# Patient Record
Sex: FEMALE | Race: WHITE | Employment: UNEMPLOYED | URBAN - METROPOLITAN AREA
[De-identification: names, ages, dates, MRNs, and addresses within clinical notes are randomized per-mention and may not be internally consistent; named-entity substitution may affect disease eponyms.]

---

## 2022-10-10 ENCOUNTER — HOSPITAL ENCOUNTER (INPATIENT)
Age: 67
LOS: 6 days | Discharge: HOME HEALTH | DRG: 641 | End: 2022-10-16
Attending: STUDENT IN AN ORGANIZED HEALTH CARE EDUCATION/TRAINING PROGRAM | Admitting: GENERAL PRACTICE
Payer: MEDICARE

## 2022-10-10 ENCOUNTER — APPOINTMENT (OUTPATIENT)
Dept: ULTRASOUND IMAGING | Age: 67
DRG: 641 | End: 2022-10-10
Payer: MEDICARE

## 2022-10-10 ENCOUNTER — APPOINTMENT (OUTPATIENT)
Dept: GENERAL RADIOLOGY | Age: 67
DRG: 641 | End: 2022-10-10
Payer: MEDICARE

## 2022-10-10 ENCOUNTER — APPOINTMENT (OUTPATIENT)
Dept: CT IMAGING | Age: 67
DRG: 641 | End: 2022-10-10
Payer: MEDICARE

## 2022-10-10 DIAGNOSIS — N39.0 URINARY TRACT INFECTION WITH HEMATURIA, SITE UNSPECIFIED: ICD-10-CM

## 2022-10-10 DIAGNOSIS — R60.0 LEG EDEMA: ICD-10-CM

## 2022-10-10 DIAGNOSIS — E87.1 HYPONATREMIA: Primary | ICD-10-CM

## 2022-10-10 DIAGNOSIS — R18.8 CIRRHOSIS OF LIVER WITH ASCITES, UNSPECIFIED HEPATIC CIRRHOSIS TYPE (HCC): ICD-10-CM

## 2022-10-10 DIAGNOSIS — R31.9 URINARY TRACT INFECTION WITH HEMATURIA, SITE UNSPECIFIED: ICD-10-CM

## 2022-10-10 DIAGNOSIS — K74.60 CIRRHOSIS OF LIVER WITH ASCITES, UNSPECIFIED HEPATIC CIRRHOSIS TYPE (HCC): ICD-10-CM

## 2022-10-10 DIAGNOSIS — R17 ELEVATED BILIRUBIN: ICD-10-CM

## 2022-10-10 DIAGNOSIS — D64.9 ANEMIA, UNSPECIFIED TYPE: ICD-10-CM

## 2022-10-10 LAB
ALBUMIN SERPL-MCNC: 3.5 G/DL (ref 3.5–5.2)
ALP BLD-CCNC: 89 U/L (ref 35–104)
ALT SERPL-CCNC: 28 U/L (ref 0–32)
ANION GAP SERPL CALCULATED.3IONS-SCNC: 13 MMOL/L (ref 7–16)
ANISOCYTOSIS: ABNORMAL
AST SERPL-CCNC: 49 U/L (ref 0–31)
BACTERIA: ABNORMAL /HPF
BASOPHILS ABSOLUTE: 0.03 E9/L (ref 0–0.2)
BASOPHILS RELATIVE PERCENT: 0.5 % (ref 0–2)
BILIRUB SERPL-MCNC: 1.3 MG/DL (ref 0–1.2)
BILIRUBIN URINE: ABNORMAL
BLOOD, URINE: NEGATIVE
BUN BLDV-MCNC: 23 MG/DL (ref 6–23)
CALCIUM SERPL-MCNC: 9 MG/DL (ref 8.6–10.2)
CHLORIDE BLD-SCNC: 88 MMOL/L (ref 98–107)
CHLORIDE URINE RANDOM: 23 MMOL/L
CLARITY: CLEAR
CO2: 21 MMOL/L (ref 22–29)
COLOR: ABNORMAL
CREAT SERPL-MCNC: 1 MG/DL (ref 0.5–1)
EOSINOPHILS ABSOLUTE: 0 E9/L (ref 0.05–0.5)
EOSINOPHILS RELATIVE PERCENT: 0 % (ref 0–6)
GFR AFRICAN AMERICAN: >60
GFR NON-AFRICAN AMERICAN: 55 ML/MIN/1.73
GLUCOSE BLD-MCNC: 261 MG/DL (ref 74–99)
GLUCOSE URINE: 250 MG/DL
HCT VFR BLD CALC: 29.8 % (ref 34–48)
HEMOGLOBIN: 8.4 G/DL (ref 11.5–15.5)
HYPOCHROMIA: ABNORMAL
IMMATURE GRANULOCYTES #: 0.04 E9/L
IMMATURE GRANULOCYTES %: 0.6 % (ref 0–5)
KETONES, URINE: ABNORMAL MG/DL
LEUKOCYTE ESTERASE, URINE: NEGATIVE
LIPASE: 92 U/L (ref 13–60)
LYMPHOCYTES ABSOLUTE: 1.07 E9/L (ref 1.5–4)
LYMPHOCYTES RELATIVE PERCENT: 16.1 % (ref 20–42)
MCH RBC QN AUTO: 19.2 PG (ref 26–35)
MCHC RBC AUTO-ENTMCNC: 28.2 % (ref 32–34.5)
MCV RBC AUTO: 68 FL (ref 80–99.9)
MONOCYTES ABSOLUTE: 0.66 E9/L (ref 0.1–0.95)
MONOCYTES RELATIVE PERCENT: 9.9 % (ref 2–12)
NEUTROPHILS ABSOLUTE: 4.86 E9/L (ref 1.8–7.3)
NEUTROPHILS RELATIVE PERCENT: 72.9 % (ref 43–80)
NITRITE, URINE: POSITIVE
OSMOLALITY URINE: 591 MOSM/KG (ref 300–900)
OSMOLALITY: 290 MOSM/KG (ref 285–310)
OVALOCYTES: ABNORMAL
PDW BLD-RTO: 18.1 FL (ref 11.5–15)
PH UA: 5.5 (ref 5–9)
PLATELET # BLD: 89 E9/L (ref 130–450)
PLATELET CONFIRMATION: NORMAL
PMV BLD AUTO: ABNORMAL FL (ref 7–12)
POIKILOCYTES: ABNORMAL
POLYCHROMASIA: ABNORMAL
POTASSIUM REFLEX MAGNESIUM: 4.2 MMOL/L (ref 3.5–5)
POTASSIUM, UR: 97.8 MMOL/L
PRO-BNP: 518 PG/ML (ref 0–125)
PROTEIN UA: 30 MG/DL
RBC # BLD: 4.38 E12/L (ref 3.5–5.5)
RBC UA: ABNORMAL /HPF (ref 0–2)
SODIUM BLD-SCNC: 122 MMOL/L (ref 132–146)
SODIUM URINE: <20 MMOL/L
SPECIFIC GRAVITY UA: >=1.03 (ref 1–1.03)
TOTAL PROTEIN: 7.9 G/DL (ref 6.4–8.3)
TROPONIN, HIGH SENSITIVITY: 16 NG/L (ref 0–9)
UROBILINOGEN, URINE: 1 E.U./DL
WBC # BLD: 6.7 E9/L (ref 4.5–11.5)
WBC UA: ABNORMAL /HPF (ref 0–5)

## 2022-10-10 PROCEDURE — 81001 URINALYSIS AUTO W/SCOPE: CPT

## 2022-10-10 PROCEDURE — 85025 COMPLETE CBC W/AUTO DIFF WBC: CPT

## 2022-10-10 PROCEDURE — 87077 CULTURE AEROBIC IDENTIFY: CPT

## 2022-10-10 PROCEDURE — 74176 CT ABD & PELVIS W/O CONTRAST: CPT

## 2022-10-10 PROCEDURE — 87186 SC STD MICRODIL/AGAR DIL: CPT

## 2022-10-10 PROCEDURE — 93970 EXTREMITY STUDY: CPT

## 2022-10-10 PROCEDURE — 83935 ASSAY OF URINE OSMOLALITY: CPT

## 2022-10-10 PROCEDURE — 84133 ASSAY OF URINE POTASSIUM: CPT

## 2022-10-10 PROCEDURE — 80053 COMPREHEN METABOLIC PANEL: CPT

## 2022-10-10 PROCEDURE — 83690 ASSAY OF LIPASE: CPT

## 2022-10-10 PROCEDURE — 87088 URINE BACTERIA CULTURE: CPT

## 2022-10-10 PROCEDURE — 84484 ASSAY OF TROPONIN QUANT: CPT

## 2022-10-10 PROCEDURE — 2060000000 HC ICU INTERMEDIATE R&B

## 2022-10-10 PROCEDURE — 99285 EMERGENCY DEPT VISIT HI MDM: CPT

## 2022-10-10 PROCEDURE — 96374 THER/PROPH/DIAG INJ IV PUSH: CPT

## 2022-10-10 PROCEDURE — 84300 ASSAY OF URINE SODIUM: CPT

## 2022-10-10 PROCEDURE — 36415 COLL VENOUS BLD VENIPUNCTURE: CPT

## 2022-10-10 PROCEDURE — 2580000003 HC RX 258: Performed by: STUDENT IN AN ORGANIZED HEALTH CARE EDUCATION/TRAINING PROGRAM

## 2022-10-10 PROCEDURE — 6360000002 HC RX W HCPCS: Performed by: STUDENT IN AN ORGANIZED HEALTH CARE EDUCATION/TRAINING PROGRAM

## 2022-10-10 PROCEDURE — 83880 ASSAY OF NATRIURETIC PEPTIDE: CPT

## 2022-10-10 PROCEDURE — 93005 ELECTROCARDIOGRAM TRACING: CPT | Performed by: PHYSICIAN ASSISTANT

## 2022-10-10 PROCEDURE — 71046 X-RAY EXAM CHEST 2 VIEWS: CPT

## 2022-10-10 PROCEDURE — 82436 ASSAY OF URINE CHLORIDE: CPT

## 2022-10-10 PROCEDURE — 6370000000 HC RX 637 (ALT 250 FOR IP): Performed by: STUDENT IN AN ORGANIZED HEALTH CARE EDUCATION/TRAINING PROGRAM

## 2022-10-10 PROCEDURE — 83930 ASSAY OF BLOOD OSMOLALITY: CPT

## 2022-10-10 RX ORDER — FUROSEMIDE 40 MG/1
20 TABLET ORAL ONCE
Status: COMPLETED | OUTPATIENT
Start: 2022-10-10 | End: 2022-10-10

## 2022-10-10 RX ORDER — OXYCODONE HYDROCHLORIDE AND ACETAMINOPHEN 5; 325 MG/1; MG/1
1 TABLET ORAL ONCE
Status: COMPLETED | OUTPATIENT
Start: 2022-10-10 | End: 2022-10-10

## 2022-10-10 RX ORDER — SODIUM CHLORIDE 1000 MG
1 TABLET, SOLUBLE MISCELLANEOUS ONCE
Status: COMPLETED | OUTPATIENT
Start: 2022-10-10 | End: 2022-10-10

## 2022-10-10 RX ADMIN — Medication 1 G: at 21:51

## 2022-10-10 RX ADMIN — WATER 2000 MG: 1 INJECTION INTRAMUSCULAR; INTRAVENOUS; SUBCUTANEOUS at 21:08

## 2022-10-10 RX ADMIN — OXYCODONE AND ACETAMINOPHEN 1 TABLET: 5; 325 TABLET ORAL at 18:47

## 2022-10-10 RX ADMIN — FUROSEMIDE 20 MG: 40 TABLET ORAL at 21:50

## 2022-10-10 ASSESSMENT — PAIN - FUNCTIONAL ASSESSMENT: PAIN_FUNCTIONAL_ASSESSMENT: 0-10

## 2022-10-10 ASSESSMENT — PAIN SCALES - GENERAL
PAINLEVEL_OUTOF10: 10
PAINLEVEL_OUTOF10: 0

## 2022-10-10 ASSESSMENT — PAIN DESCRIPTION - ORIENTATION: ORIENTATION: LEFT;RIGHT

## 2022-10-10 ASSESSMENT — PAIN DESCRIPTION - LOCATION: LOCATION: LEG

## 2022-10-10 NOTE — ED TRIAGE NOTES
FIRST PROVIDER CONTACT ASSESSMENT NOTE       Department of Emergency Medicine                 First Provider Note            10/10/22  3:30 PM EDT    Date of Encounter: No admission date for patient encounter. Patient Name: Lawson De Luna  : 1955  MRN: [id number]    Chief Complaint: Leg Swelling (bilat lower leg edema and pain x 2 wks)      History of Present Illness:   Lawson De Luna is a 79 y.o. female who presents to the ED for LE swelling and pain. Lump on right thigh. Pt did see her PCP and had imaging but states she hadn't received any news on results. Denies CP or SOB. Focused Physical Exam:  VS:    ED Triage Vitals [10/10/22 1526]   BP Temp Temp src Heart Rate Resp SpO2 Height Weight   107/68 99.1 °F (37.3 °C) -- 94 16 100 % 5' 6\" (1.676 m) 140 lb (63.5 kg)        Physical Ex: Constitutional: Alert and non-toxic. Medical History:  has a past medical history of Hypertension. Surgical History:  has no past surgical history on file. Social History:  reports that she quit smoking about 15 years ago. Her smoking use included cigarettes. She has never used smokeless tobacco. She reports that she does not drink alcohol and does not use drugs. Family History: family history is not on file. Allergies: Patient has no known allergies.      Initial Plan of Care: Initiate Treatment-Testing, Proceed toTreatment Area When Bed Available for ED Attending/MLP to Continue Care      ---END OF FIRST PROVIDER CONTACT ASSESSMENT NOTE---  Electronically signed by Marylen Pugh, PA-C   DD: 10/10/22

## 2022-10-10 NOTE — ED NOTES
Patient presents to ED via private vehicle from home for bilateral leg swelling. Pt denies chest pain, sob, or hx of CHF. Patient has a lump on the R thigh that is painful to the touch. Symptoms started a few days ago per pt, denies any other complaints at this time.       Jacky Valera RN  10/10/22 [id number]

## 2022-10-11 ENCOUNTER — APPOINTMENT (OUTPATIENT)
Dept: MRI IMAGING | Age: 67
DRG: 641 | End: 2022-10-11
Payer: MEDICARE

## 2022-10-11 LAB
ALBUMIN SERPL-MCNC: 3.1 G/DL (ref 3.5–5.2)
ALP BLD-CCNC: 81 U/L (ref 35–104)
ALT SERPL-CCNC: 26 U/L (ref 0–32)
AMMONIA: 44.6 UMOL/L (ref 11–51)
ANION GAP SERPL CALCULATED.3IONS-SCNC: 10 MMOL/L (ref 7–16)
ANION GAP SERPL CALCULATED.3IONS-SCNC: 12 MMOL/L (ref 7–16)
AST SERPL-CCNC: 44 U/L (ref 0–31)
BILIRUB SERPL-MCNC: 0.8 MG/DL (ref 0–1.2)
BUN BLDV-MCNC: 28 MG/DL (ref 6–23)
BUN BLDV-MCNC: 30 MG/DL (ref 6–23)
CALCIUM SERPL-MCNC: 8.3 MG/DL (ref 8.6–10.2)
CALCIUM SERPL-MCNC: 8.7 MG/DL (ref 8.6–10.2)
CEA: 10.8 NG/ML (ref 0–5.2)
CHLORIDE BLD-SCNC: 93 MMOL/L (ref 98–107)
CHLORIDE BLD-SCNC: 93 MMOL/L (ref 98–107)
CO2: 22 MMOL/L (ref 22–29)
CO2: 22 MMOL/L (ref 22–29)
CREAT SERPL-MCNC: 0.8 MG/DL (ref 0.5–1)
CREAT SERPL-MCNC: 1 MG/DL (ref 0.5–1)
EKG ATRIAL RATE: 86 BPM
EKG P AXIS: 39 DEGREES
EKG P-R INTERVAL: 162 MS
EKG Q-T INTERVAL: 392 MS
EKG QRS DURATION: 74 MS
EKG QTC CALCULATION (BAZETT): 469 MS
EKG R AXIS: 2 DEGREES
EKG T AXIS: 27 DEGREES
EKG VENTRICULAR RATE: 86 BPM
FERRITIN: 25 NG/ML
GFR AFRICAN AMERICAN: >60
GFR AFRICAN AMERICAN: >60
GFR NON-AFRICAN AMERICAN: 55 ML/MIN/1.73
GFR NON-AFRICAN AMERICAN: >60 ML/MIN/1.73
GLUCOSE BLD-MCNC: 234 MG/DL (ref 74–99)
GLUCOSE BLD-MCNC: 238 MG/DL (ref 74–99)
HCT VFR BLD CALC: 27.1 % (ref 34–48)
HEMOGLOBIN: 7.7 G/DL (ref 11.5–15.5)
INR BLD: 1.9
IRON SATURATION: 4 % (ref 15–50)
IRON: 13 MCG/DL (ref 37–145)
MCH RBC QN AUTO: 19.5 PG (ref 26–35)
MCHC RBC AUTO-ENTMCNC: 28.4 % (ref 32–34.5)
MCV RBC AUTO: 68.6 FL (ref 80–99.9)
METER GLUCOSE: 228 MG/DL (ref 74–99)
METER GLUCOSE: 255 MG/DL (ref 74–99)
METER GLUCOSE: 283 MG/DL (ref 74–99)
PDW BLD-RTO: 18 FL (ref 11.5–15)
PLATELET # BLD: 81 E9/L (ref 130–450)
PLATELET CONFIRMATION: NORMAL
PMV BLD AUTO: ABNORMAL FL (ref 7–12)
POTASSIUM SERPL-SCNC: 3.8 MMOL/L (ref 3.5–5)
POTASSIUM SERPL-SCNC: 3.9 MMOL/L (ref 3.5–5)
PROTHROMBIN TIME: 21.4 SEC (ref 9.3–12.4)
RBC # BLD: 3.95 E12/L (ref 3.5–5.5)
SODIUM BLD-SCNC: 125 MMOL/L (ref 132–146)
SODIUM BLD-SCNC: 127 MMOL/L (ref 132–146)
T4 TOTAL: 8.9 MCG/DL (ref 4.5–11.7)
TOTAL IRON BINDING CAPACITY: 310 MCG/DL (ref 250–450)
TOTAL PROTEIN: 7.3 G/DL (ref 6.4–8.3)
TSH SERPL DL<=0.05 MIU/L-ACNC: 0.88 UIU/ML (ref 0.27–4.2)
WBC # BLD: 6.8 E9/L (ref 4.5–11.5)

## 2022-10-11 PROCEDURE — A9577 INJ MULTIHANCE: HCPCS | Performed by: RADIOLOGY

## 2022-10-11 PROCEDURE — 84443 ASSAY THYROID STIM HORMONE: CPT

## 2022-10-11 PROCEDURE — 82962 GLUCOSE BLOOD TEST: CPT

## 2022-10-11 PROCEDURE — 82728 ASSAY OF FERRITIN: CPT

## 2022-10-11 PROCEDURE — 6360000004 HC RX CONTRAST MEDICATION: Performed by: RADIOLOGY

## 2022-10-11 PROCEDURE — 85027 COMPLETE CBC AUTOMATED: CPT

## 2022-10-11 PROCEDURE — 6370000000 HC RX 637 (ALT 250 FOR IP): Performed by: GENERAL PRACTICE

## 2022-10-11 PROCEDURE — 84436 ASSAY OF TOTAL THYROXINE: CPT

## 2022-10-11 PROCEDURE — 82105 ALPHA-FETOPROTEIN SERUM: CPT

## 2022-10-11 PROCEDURE — 82378 CARCINOEMBRYONIC ANTIGEN: CPT

## 2022-10-11 PROCEDURE — 80053 COMPREHEN METABOLIC PANEL: CPT

## 2022-10-11 PROCEDURE — 80048 BASIC METABOLIC PNL TOTAL CA: CPT

## 2022-10-11 PROCEDURE — 85610 PROTHROMBIN TIME: CPT

## 2022-10-11 PROCEDURE — 82103 ALPHA-1-ANTITRYPSIN TOTAL: CPT

## 2022-10-11 PROCEDURE — 6370000000 HC RX 637 (ALT 250 FOR IP): Performed by: INTERNAL MEDICINE

## 2022-10-11 PROCEDURE — 86255 FLUORESCENT ANTIBODY SCREEN: CPT

## 2022-10-11 PROCEDURE — 73720 MRI LWR EXTREMITY W/O&W/DYE: CPT

## 2022-10-11 PROCEDURE — 83540 ASSAY OF IRON: CPT

## 2022-10-11 PROCEDURE — 83550 IRON BINDING TEST: CPT

## 2022-10-11 PROCEDURE — 2060000000 HC ICU INTERMEDIATE R&B

## 2022-10-11 PROCEDURE — 82140 ASSAY OF AMMONIA: CPT

## 2022-10-11 PROCEDURE — 82784 ASSAY IGA/IGD/IGG/IGM EACH: CPT

## 2022-10-11 PROCEDURE — 86038 ANTINUCLEAR ANTIBODIES: CPT

## 2022-10-11 PROCEDURE — 36415 COLL VENOUS BLD VENIPUNCTURE: CPT

## 2022-10-11 PROCEDURE — 82787 IGG 1 2 3 OR 4 EACH: CPT

## 2022-10-11 PROCEDURE — 80074 ACUTE HEPATITIS PANEL: CPT

## 2022-10-11 RX ORDER — SODIUM CHLORIDE 1000 MG
1 TABLET, SOLUBLE MISCELLANEOUS
Status: DISCONTINUED | OUTPATIENT
Start: 2022-10-11 | End: 2022-10-12

## 2022-10-11 RX ORDER — INSULIN LISPRO 100 [IU]/ML
0-4 INJECTION, SOLUTION INTRAVENOUS; SUBCUTANEOUS NIGHTLY
Status: DISCONTINUED | OUTPATIENT
Start: 2022-10-11 | End: 2022-10-16 | Stop reason: HOSPADM

## 2022-10-11 RX ORDER — INSULIN LISPRO 100 [IU]/ML
0-4 INJECTION, SOLUTION INTRAVENOUS; SUBCUTANEOUS
Status: DISCONTINUED | OUTPATIENT
Start: 2022-10-11 | End: 2022-10-16 | Stop reason: HOSPADM

## 2022-10-11 RX ORDER — OXYCODONE HYDROCHLORIDE AND ACETAMINOPHEN 5; 325 MG/1; MG/1
1 TABLET ORAL ONCE
Status: COMPLETED | OUTPATIENT
Start: 2022-10-11 | End: 2022-10-11

## 2022-10-11 RX ADMIN — INSULIN LISPRO 2 UNITS: 100 INJECTION, SOLUTION INTRAVENOUS; SUBCUTANEOUS at 16:00

## 2022-10-11 RX ADMIN — INSULIN LISPRO 1 UNITS: 100 INJECTION, SOLUTION INTRAVENOUS; SUBCUTANEOUS at 10:54

## 2022-10-11 RX ADMIN — OXYCODONE AND ACETAMINOPHEN 1 TABLET: 5; 325 TABLET ORAL at 10:21

## 2022-10-11 RX ADMIN — GADOBENATE DIMEGLUMINE 12 ML: 529 INJECTION, SOLUTION INTRAVENOUS at 18:03

## 2022-10-11 RX ADMIN — Medication 1 G: at 16:00

## 2022-10-11 ASSESSMENT — PAIN DESCRIPTION - ORIENTATION: ORIENTATION: RIGHT

## 2022-10-11 ASSESSMENT — PAIN SCALES - GENERAL
PAINLEVEL_OUTOF10: 0
PAINLEVEL_OUTOF10: 3
PAINLEVEL_OUTOF10: 0
PAINLEVEL_OUTOF10: 3
PAINLEVEL_OUTOF10: 5

## 2022-10-11 ASSESSMENT — ENCOUNTER SYMPTOMS
NAUSEA: 0
ABDOMINAL DISTENTION: 0
VOMITING: 0
SHORTNESS OF BREATH: 0
DIARRHEA: 0
PHOTOPHOBIA: 0
CHEST TIGHTNESS: 0
COUGH: 0
ABDOMINAL PAIN: 0

## 2022-10-11 ASSESSMENT — PAIN - FUNCTIONAL ASSESSMENT: PAIN_FUNCTIONAL_ASSESSMENT: ACTIVITIES ARE NOT PREVENTED

## 2022-10-11 ASSESSMENT — PAIN DESCRIPTION - DESCRIPTORS: DESCRIPTORS: ACHING

## 2022-10-11 ASSESSMENT — PAIN DESCRIPTION - LOCATION: LOCATION: LEG

## 2022-10-11 NOTE — ED NOTES
The following labs labeled with pt sticker and tubed to lab:     [] Blue     [] Tena Jordan   [] on ice  [] Green/yellow  [] Green/black [] on ice  [x] Yellow  [] Red  [] Pink      [] COVID-19 swab    [] Rapid  [] PCR  [] Flu Swab  [] Strep Swab  [] Peds Viral Panel     [] Urine Sample  [] Pelvic Cultures  [] Blood Cultures   [] Wound Cultures          Isis Yin RN  10/10/22 2022

## 2022-10-11 NOTE — CONSULTS
Gastroenterology Consult Note   EDWAR Cordova NP-C with Mili Fall M.D. Consult Note        Date of Service: 10/11/2022  Reason for Consult: cirrhosis   Requesting Physician: Dr. Juan Medford:  lower leg edema     History Obtained From:  patient, electronic medical record    HISTORY OF PRESENT ILLNESS:       Giancarlo Carpio is a 79 y.o. female with significant past medical history of HTN admitted via ED for lower leg swelling. Pt reports she had gradual onset bilateral lower leg edema starting approximately 2 weeks ago. Denies other GI symptoms, no epigastric pain, abdominal pain, nausea, vomiting, dysphagia, abdominal distention or unintentional weight loss. Bowel movements are\" normal\", denies constipation or diarrhea. Denies melena or hematochezia. Tolerating diet. States otherwise healthy. No known history of liver disease. Denies shortness of breath, fatigue or confusion. No prior endoscopic work-up. History of tobacco use quit 17 years ago. History of alcohol use 2-3 beers 1-2 times weekly, quit 17 years ago. Denies history of illicit drug use. History of hypertension no known history of high cholesterol or diabetes. Denies family history of colon or other GI cancers. Admission labs AST 49, bilirubin 1.3, hemoglobin 8.4, platelet 89, sodium 421. CT abdomen pelvis WO contrast- 1. Liver cirrhosis with mild splenomegaly. Minimal ascites. 2. Liquid stool in the proximal colon indicating diarrheal illness. 3.  Subtly increased renal medullary density, which may be seen from any cause resulting in increased urine osmolality , most commonly dehydration. However, the differential diagnosis for the \"ense renal medulla sign\" is wide, and includes: Hyponatremia, high-protein diet, glucosuria, adrenal insufficiency SIADH, hyperparathyroidism medullary sponge kidneys etc. Early/mild presentation of medullary nephrocalcinosis may present similarly. Clinical correlation is needed.  4. Small hiatal hernia. 5. Cholelithiasis. Consultation for cirrhosis. Currently, pt reports no GI symptoms. Bilateral lower leg edema. Labs today sodium 127, glucose 238, AST 44, hemoglobin 7.7, MCV 68.6, platelet 81. Past Medical History:        Diagnosis Date    Hypertension      Past Surgical History:    No past surgical history on file. Current Medications:    Current Facility-Administered Medications: insulin lispro (HUMALOG) injection vial 0-4 Units, 0-4 Units, SubCUTAneous, TID WC  insulin lispro (HUMALOG) injection vial 0-4 Units, 0-4 Units, SubCUTAneous, Nightly    Allergies:  Patient has no known allergies. Social History:    Tobacco:  Pt reports history quit 17 years ago  Alcohol:  Pt reports history of 2-3 beers 1-2 times weekly, quit 17 years ago  Illicit Drugs: Pt reports no history or current use    Family History:   Denies family history of colon or other GI cancers/bowel disease    REVIEW OF SYSTEMS:    Aside from what was mentioned in the [address] and HPI, essentially unremarkable, all others negative. PHYSICAL EXAM:      Vitals:    BP (!) 148/75   Pulse 84   Temp 99.7 °F (37.6 °C) (Oral)   Resp 18   Ht 5' 6\" (1.676 m)   Wt 133 lb 1.6 oz (60.4 kg)   SpO2 98%   BMI 21.48 kg/m²       CONSTITUTIONAL:  awake, alert, cooperative, no apparent distress, and appears stated age  EYES:  pupils equal, round and reactive to light, sclera anicteric and conjunctiva normal  ENT:  normocephalic, oral pharynx with moist mucous membranes  NECK:  supple   HEMATOLOGIC/LYMPHATICS:  no cervical lymphadenopathy and no supraclavicular lymphadenopathy  LUNGS:  No increased work of breathing, good air exchange, clear to auscultation bilaterally.   CARDIOVASCULAR:  Normal apical impulse, regular rate and rhythm, no murmur noted; 2+ pulses; 1+ edema  ABDOMEN:  normal bowel sounds, soft, non-distended, non-tender, no masses palpated, no hepatosplenomegally  MUSCULOSKELETAL:  full range of motion noted  motor strength is 5 out of 5 all extremities bilaterally  NEUROLOGIC:  Mental Status Exam:  Level of Alertness:   awake  Orientation:   person, place, time  Motor Exam:  Motor exam is symmetrical 5 out of 5 all extremities bilaterally  SKIN:  normal skin color, texture, turgor    DATA:    CBC with Differential:    Lab Results   Component Value Date/Time    WBC 6.8 10/11/2022 03:02 AM    RBC 3.95 10/11/2022 03:02 AM    HGB 7.7 10/11/2022 03:02 AM    HCT 27.1 10/11/2022 03:02 AM    PLT 81 10/11/2022 03:02 AM    MCV 68.6 10/11/2022 03:02 AM    MCH 19.5 10/11/2022 03:02 AM    MCHC 28.4 10/11/2022 03:02 AM    RDW 18.0 10/11/2022 03:02 AM    LYMPHOPCT 16.1 10/10/2022 04:16 PM    MONOPCT 9.9 10/10/2022 04:16 PM    BASOPCT 0.5 10/10/2022 04:16 PM    MONOSABS 0.66 10/10/2022 04:16 PM    LYMPHSABS 1.07 10/10/2022 04:16 PM    EOSABS 0.00 10/10/2022 04:16 PM    BASOSABS 0.03 10/10/2022 04:16 PM     CMP:    Lab Results   Component Value Date/Time     10/11/2022 03:02 AM    K 3.9 10/11/2022 03:02 AM    K 4.2 10/10/2022 04:16 PM    CL 93 10/11/2022 03:02 AM    CO2 22 10/11/2022 03:02 AM    BUN 28 10/11/2022 03:02 AM    CREATININE 1.0 10/11/2022 03:02 AM    GFRAA >60 10/11/2022 03:02 AM    LABGLOM 55 10/11/2022 03:02 AM    GLUCOSE 238 10/11/2022 03:02 AM    PROT 7.3 10/11/2022 03:02 AM    LABALBU 3.1 10/11/2022 03:02 AM    CALCIUM 8.7 10/11/2022 03:02 AM    BILITOT 0.8 10/11/2022 03:02 AM    ALKPHOS 81 10/11/2022 03:02 AM    AST 44 10/11/2022 03:02 AM    ALT 26 10/11/2022 03:02 AM     Hepatic Function Panel:    Lab Results   Component Value Date/Time    ALKPHOS 81 10/11/2022 03:02 AM    ALT 26 10/11/2022 03:02 AM    AST 44 10/11/2022 03:02 AM    PROT 7.3 10/11/2022 03:02 AM    BILITOT 0.8 10/11/2022 03:02 AM    LABALBU 3.1 10/11/2022 03:02 AM     CT ABDOMEN PELVIS WO CONTRAST Additional Contrast? None    Result Date: 10/10/2022  EXAMINATION: CT OF THE ABDOMEN AND PELVIS WITHOUT CONTRAST 10/10/2022 8:05 pm TECHNIQUE: CT of the abdomen and pelvis was performed without the administration of intravenous contrast. Multiplanar reformatted images are provided for review. Automated exposure control, iterative reconstruction, and/or weight based adjustment of the mA/kV was utilized to reduce the radiation dose to as low as reasonably achievable. COMPARISON: CT of the pelvis, 10/14/2014 HISTORY: ORDERING SYSTEM PROVIDED HISTORY: elevated bilirubin, ruq tender TECHNOLOGIST PROVIDED HISTORY: Reason for exam:->elevated bilirubin, ruq tender Additional Contrast?->None Decision Support Exception - unselect if not a suspected or confirmed emergency medical condition->Emergency Medical Condition (MA) FINDINGS: Lower Chest: The lung bases are clear. The heart is normal in size. No pleural or pericardial effusion. Small hiatal hernia. Relative hypodensity of the blood in the cardiac chambers consistent with patient's history of anemia. Organs: Liver: Irregular liver contour indicating cirrhosis. No focal lesion or ductal dilatation appreciated. Gallbladder: Multiple small calcified gallstones are seen. No evidence of pericholecystic edema. Pancreas: Unremarkable. Spleen: The spleen is mildly enlarged, measuring 15.3 cm in length. Adrenals: Unremarkable. Kidneys: Normal in size and contour. Subtly increased renal medullary density. GI/Bowel: Liquid stool is seen in the proximal colon. No bowel wall thickening or obstruction. Pelvis: The urinary bladder is unremarkable. Within limits of the CT technique, the uterus and the adnexa are grossly unremarkable. Peritoneum/Retroperitoneum: Prominent calcified atherosclerosis is seen in the aorta. No aneurysm. No lymphadenopathy. No free air. Small volume perihepatic ascites. Bones/Soft Tissues: The visualized bones are intact without fracture or focal lesion. Prominent loss of disc height at L5-S1.     1. Liver cirrhosis with mild splenomegaly. Minimal ascites.  2. Liquid stool in the proximal colon indicating diarrheal illness. 3.  Subtly increased renal medullary density, which may be seen from any cause resulting in increased urine osmolality , most commonly dehydration. However, the differential diagnosis for the \"ense renal medulla sign\" is wide, and includes: Hyponatremia, high-protein diet, glucosuria, adrenal insufficiency SIADH, hyperparathyroidism medullary sponge kidneys etc. Early/mild presentation of medullary nephrocalcinosis may present similarly. Clinical correlation is needed. 4. Small hiatal hernia. 5. Cholelithiasis. XR CHEST (2 VW)    Result Date: 10/10/2022  EXAMINATION: TWO XRAY VIEWS OF THE CHEST 10/10/2022 4:23 pm COMPARISON: None. HISTORY: ORDERING SYSTEM PROVIDED HISTORY: LE edema TECHNOLOGIST PROVIDED HISTORY: Reason for exam:->LE edema FINDINGS: The lungs are without acute focal process. There is no effusion or pneumothorax. The cardiomediastinal silhouette is without acute process. The osseous structures are without acute process. No acute process. US DUP LOWER EXTREMITIES BILATERAL VENOUS    Result Date: 10/10/2022  EXAMINATION: DUPLEX VENOUS ULTRASOUND OF THE BILATERAL LOWER [id number]/10/2022 7:25 pm TECHNIQUE: Duplex ultrasound using B-mode/gray scaled imaging, Doppler spectral analysis and color flow Doppler was obtained of the deep venous structures of the lower bilateral extremities. COMPARISON: None. HISTORY: ORDERING SYSTEM PROVIDED HISTORY: dvt TECHNOLOGIST PROVIDED HISTORY: Reason for exam:->dvt What reading provider will be dictating this exam?->CRC FINDINGS: The visualized veins of the bilateral lower extremities are patent and free of echogenic thrombus. The veins demonstrate good compressibility with normal color flow study and spectral analysis.  Anterior right thigh in a region of apparent palpable abnormality demonstrates a heterogeneous mass area which is nonspecific at ultrasound could represent hematoma such as if injury correlating, other etiologies including neoplasm cannot be excluded. Measures on the order of 4 cm without obvious internal color flow demonstrated     No evidence of DVT in either lower extremity. Right anterior thigh mass. RECOMMENDATIONS: Clinical correlation. Consider MRI or focused ultrasound as clinically warranted. IMPRESSION:  Liver Cirrhosis  Ascites, minimal on CT  Bilateral lower leg edema   Cholelithiasis   Hyponatremia   Elevated lipase   Anemia, microcytic   Thrombocytopenia     RECOMMENDATIONS:    Liver serology ordered  Iron studies ordered  Hepatitis panel ordered   INR ordered  Fasting lipids in am  Diet as tolerated  Lasix 20 mg daily- if ok with nephrology  Aldactone 50 mg daily- if ok with nephrology   Occult stool, please document in notes  Monitor stools  Monitor CBC, CMP and lipase daily   Supportive care  Continue to monitor       Note: This report was completed utilizing computer voice recognition software. Every effort has been made to ensure accuracy, however; inadvertent computerized transcription errors may be present. Thank you very much for your consultation. We will follow closely with you.     Discussed with Dr. Hayde Sharp developed by Dr. Lino Crockett, APRN, NP-C 10/11/2022 10:54 AM for Dr. Steve Pearl

## 2022-10-11 NOTE — ED NOTES
Concepcion faxed and confirmed by [id number] [facility].      Rosalia Held  10/10/22 [id number]

## 2022-10-11 NOTE — CONSULTS
Nephrology Consult Note  Patient's Name: Moe Chapman  10:18 AM  10/11/2022    Nephrologist: William Tian    Reason for Consult:  Hyponatremia  Requesting Physician:  Milton Ku DO    Chief Complaint:  LE Swelling and Pain    History Obtained From:  patient and past medical records    History of Present Ilness:    Moe Chapman is a 79 y.o. female with prior history of HTN on lisinopril as an outpt. Pt presented to the ED with the recent Hx of LE edema and fatigue. She denied CP or SOB. She has no N/V/D. She uses no NSAID. She denies lightheadedness or dizziness. She has no urinary tract symptoms. CT Scan showed cirrhosis with ascites as well as a renal medullary density, Na+ 122, HgB 8.4, PLT 89    Past Medical History:   Diagnosis Date    Hypertension        No past surgical history on file. No family history on file. reports that she quit smoking about 15 years ago. Her smoking use included cigarettes. She has never used smokeless tobacco. She reports that she does not drink alcohol and does not use drugs. Allergies:  Patient has no known allergies. Current Medications:    oxyCODONE-acetaminophen (PERCOCET) 5-325 MG per tablet 1 tablet, Once  insulin lispro (HUMALOG) injection vial 0-4 Units, TID WC  insulin lispro (HUMALOG) injection vial 0-4 Units, Nightly        Review of Systems:   Pertinent items are noted in HPI.     Physical exam:   Constitutional:  Elderly female in NAD  Vitals: VITALS:  BP (!) 148/75   Pulse 84   Temp 99.7 °F (37.6 °C) (Oral)   Resp 18   Ht 5' 6\" (1.676 m)   Wt 133 lb 1.6 oz (60.4 kg)   SpO2 98%   BMI 21.48 kg/m²   24HR INTAKE/OUTPUT:  No intake or output data in the 24 hours ending 10/11/22 1020  URINARY CATHETER OUTPUT ([place]):     DRAIN/TUBE OUTPUT:     VENT SETTINGS:     Additional Respiratory Assessments  Heart Rate: 84  Resp: 18  SpO2: 98 %  Skin: no rash, turgor wnl  Heent:  eomi, mmm  Neck: no bruits or jvd noted  Cardiovascular:  PMI not lat displaced S1, S2 without S3  Respiratory: CTA B without w/r/r  Abdomen:  +bs, soft, nt, nd  Ext: 1-2 pedal and LE edema lower extremity edema  Psychiatric: mood and affect appropriate    Data:   Labs:  CBC:   Lab Results   Component Value Date/Time    WBC 6.8 10/11/2022 03:02 AM    RBC 3.95 10/11/2022 03:02 AM    HGB 7.7 10/11/2022 03:02 AM    HCT 27.1 10/11/2022 03:02 AM    MCV 68.6 10/11/2022 03:02 AM    MCH 19.5 10/11/2022 03:02 AM    MCHC 28.4 10/11/2022 03:02 AM    RDW 18.0 10/11/2022 03:02 AM    PLT 81 10/11/2022 03:02 AM    MPV NOT CALC 10/11/2022 03:02 AM     CBC with Differential:    Lab Results   Component Value Date/Time    WBC 6.8 10/11/2022 03:02 AM    RBC 3.95 10/11/2022 03:02 AM    HGB 7.7 10/11/2022 03:02 AM    HCT 27.1 10/11/2022 03:02 AM    PLT 81 10/11/2022 03:02 AM    MCV 68.6 10/11/2022 03:02 AM    MCH 19.5 10/11/2022 03:02 AM    MCHC 28.4 10/11/2022 03:02 AM    RDW 18.0 10/11/2022 03:02 AM    LYMPHOPCT 16.1 10/10/2022 04:16 PM    MONOPCT 9.9 10/10/2022 04:16 PM    BASOPCT 0.5 10/10/2022 04:16 PM    MONOSABS 0.66 10/10/2022 04:16 PM    LYMPHSABS 1.07 10/10/2022 04:16 PM    EOSABS 0.00 10/10/2022 04:16 PM    BASOSABS 0.03 10/10/2022 04:16 PM     Hemoglobin/Hematocrit:    Lab Results   Component Value Date/Time    HGB 7.7 10/11/2022 03:02 AM    HCT 27.1 10/11/2022 03:02 AM     CMP:    Lab Results   Component Value Date/Time     10/11/2022 03:02 AM    K 3.9 10/11/2022 03:02 AM    K 4.2 10/10/2022 04:16 PM    CL 93 10/11/2022 03:02 AM    CO2 22 10/11/2022 03:02 AM    BUN 28 10/11/2022 03:02 AM    CREATININE 1.0 10/11/2022 03:02 AM    GFRAA >60 10/11/2022 03:02 AM    LABGLOM 55 10/11/2022 03:02 AM    GLUCOSE 238 10/11/2022 03:02 AM    PROT 7.3 10/11/2022 03:02 AM    LABALBU 3.1 10/11/2022 03:02 AM    CALCIUM 8.7 10/11/2022 03:02 AM    BILITOT 0.8 10/11/2022 03:02 AM    ALKPHOS 81 10/11/2022 03:02 AM    AST 44 10/11/2022 03:02 AM    ALT 26 10/11/2022 03:02 AM     BMP:    Lab Results   Component Value Date/Time  10/11/2022 03:02 AM    K 3.9 10/11/2022 03:02 AM    K 4.2 10/10/2022 04:16 PM    CL 93 10/11/2022 03:02 AM    CO2 22 10/11/2022 03:02 AM    BUN 28 10/11/2022 03:02 AM    LABALBU 3.1 10/11/2022 03:02 AM    CREATININE 1.0 10/11/2022 03:02 AM    CALCIUM 8.7 10/11/2022 03:02 AM    GFRAA >60 10/11/2022 03:02 AM    LABGLOM 55 10/11/2022 03:02 AM    GLUCOSE 238 10/11/2022 03:02 AM     Sodium:    Lab Results   Component Value Date/Time     10/11/2022 03:02 AM     Hepatic Function Panel:    Lab Results   Component Value Date/Time    ALKPHOS 81 10/11/2022 03:02 AM    ALT 26 10/11/2022 03:02 AM    AST 44 10/11/2022 03:02 AM    PROT 7.3 10/11/2022 03:02 AM    BILITOT 0.8 10/11/2022 03:02 AM    LABALBU 3.1 10/11/2022 03:02 AM     Albumin:    Lab Results   Component Value Date/Time    LABALBU 3.1 10/11/2022 03:02 AM     Calcium:    Lab Results   Component Value Date/Time    CALCIUM 8.7 10/11/2022 03:02 AM     Ionized Calcium:  No results found for: IONCA  Magnesium:  No results found for: MG  Phosphorus:  No results found for: PHOS  LDH:  No results found for: LDH  Uric Acid:  No results found for: Sagar Ragsdale  PT/INR:  No results found for: PROTIME, INR  PTT:  No results found for: APTT, PTT[APTT}  Troponin:  No results found for: TROPONINI  U/A:    Lab Results   Component Value Date/Time    COLORU DARK YELLOW 10/10/2022 07:09 PM    PROTEINU 30 10/10/2022 07:09 PM    PHUR 5.5 10/10/2022 07:09 PM    WBCUA 0-1 10/10/2022 07:09 PM    RBCUA 0-1 10/10/2022 07:09 PM    BACTERIA FEW 10/10/2022 07:09 PM    CLARITYU Clear 10/10/2022 07:09 PM    SPECGRAV >=1.030 10/10/2022 07:09 PM    LEUKOCYTESUR Negative 10/10/2022 07:09 PM    UROBILINOGEN 1.0 10/10/2022 07:09 PM    BILIRUBINUR MODERATE 10/10/2022 07:09 PM    BLOODU Negative 10/10/2022 07:09 PM    GLUCOSEU 250 10/10/2022 07:09 PM     ABG:  No results found for: PH, PCO2, PO2, HCO3, BE, THGB, TCO2, O2SAT  HgBA1c:  No results found for: LABA1C  Microalbumen/Creatinine ratio:  No components found for: RUCREAT  FLP:  No results found for: TRIG, HDL, LDLCALC, LDLDIRECT, LABVLDL  TSH:  No results found for: TSH  VITAMIN B12: No components found for: B12  FOLATE:  No results found for: FOLATE  Iron Saturation:  No components found for: PERCENTFE  FERRITIN:  No results found for: FERRITIN  LIPASE:    Lab Results   Component Value Date/Time    LIPASE 92 10/10/2022 04:16 PM     Fibrinogen Level:  No components found for: FIB  24 Hour Urine for Protein:  No components found for: RAWUPRO, UHRS3, LDVK98RM, UTV3  24 Hour Urine for Creatinine Clearance:  No components found for: CREAT4, UHRS10, UTV10     Imaging:  CT ABDOMEN PELVIS WO CONTRAST Additional Contrast? None [[id number]] Collected: 10/10/22 2126     Order Status: Completed Updated: 10/10/22 2136    Narrative:      EXAMINATION:   CT OF THE ABDOMEN AND PELVIS WITHOUT CONTRAST 10/10/2022 8:05 pm     TECHNIQUE:   CT of the abdomen and pelvis was performed without the administration of   intravenous contrast. Multiplanar reformatted images are provided for review. Automated exposure control, iterative reconstruction, and/or weight based   adjustment of the mA/kV was utilized to reduce the radiation dose to as low   as reasonably achievable. COMPARISON:   CT of the pelvis, 10/14/2014     HISTORY:   ORDERING SYSTEM PROVIDED HISTORY: elevated bilirubin, ruq tender   TECHNOLOGIST PROVIDED HISTORY:   Reason for exam:->elevated bilirubin, ruq tender   Additional Contrast?->None   Decision Support Exception - unselect if not a suspected or confirmed   emergency medical condition->Emergency Medical Condition (MA)     FINDINGS:   Lower Chest: The lung bases are clear. The heart is normal in size. No   pleural or pericardial effusion. Small hiatal hernia. Relative hypodensity   of the blood in the cardiac chambers consistent with patient's history of   anemia.      Organs:     Liver: Irregular liver contour indicating cirrhosis. No focal lesion or   ductal dilatation appreciated. Gallbladder: Multiple small calcified gallstones are seen. No evidence of   pericholecystic edema. Pancreas: Unremarkable. Spleen: The spleen is mildly enlarged, measuring 15.3 cm in length. Adrenals: Unremarkable. Kidneys: Normal in size and contour. Subtly increased renal medullary   density. GI/Bowel: Liquid stool is seen in the proximal colon. No bowel wall   thickening or obstruction. Pelvis: The urinary bladder is unremarkable. Within limits of the CT   technique, the uterus and the adnexa are grossly unremarkable. Peritoneum/Retroperitoneum: Prominent calcified atherosclerosis is seen in   the aorta. No aneurysm. No lymphadenopathy. No free air. Small volume   perihepatic ascites. Bones/Soft Tissues: The visualized bones are intact without fracture or focal   lesion. Prominent loss of disc height at L5-S1. Impression:      1. Liver cirrhosis with mild splenomegaly. Minimal ascites. 2. Liquid stool in the proximal colon indicating diarrheal illness. 3.  Subtly increased renal medullary density, which may be seen from any   cause resulting in increased urine osmolality , most commonly dehydration. However, the differential diagnosis for the \"ense renal medulla sign\" is   wide, and includes: Hyponatremia, high-protein diet, glucosuria, adrenal   insufficiency SIADH, hyperparathyroidism medullary sponge kidneys etc.   Early/mild presentation of medullary nephrocalcinosis may present similarly. Clinical correlation is needed. 4. Small hiatal hernia. 5. Cholelithiasis. XR CHEST (2 VW) [[id number]] Collected: 10/10/22 1818    Order Status: Completed Specimen: Chest Updated: 10/10/22 1821    Narrative:      EXAMINATION:   TWO XRAY VIEWS OF THE CHEST     10/10/2022 4:23 pm     COMPARISON:   None.      HISTORY:   ORDERING SYSTEM PROVIDED HISTORY: LE edema   TECHNOLOGIST PROVIDED HISTORY:   Reason for exam:->LE edema     FINDINGS:   The lungs are without acute focal process. There is no effusion or   pneumothorax. The cardiomediastinal silhouette is without acute process. The   osseous structures are without acute process. Impression:      No acute process. Assessment  1-Hyponatremia with a urine Na+<20 suggests a component of decreased effective renal perfusion. Component sec to Chronic Liver Disease. Pt treated with furosemide and NaCl overnight and Na+ 127 this AM and 125 this PM  PLAN:  1. Follow Na+  2. Start NaCl    2-Anemia and Thrombocytopenia in the setting of the Cirrhosis  Hgb 8.4-->7.7  PLT 89-->81  PLAN:  Await B12, folate, SPEP and UPEP  Await Fe++ studies    3-Proteinuria  possible UTI, possible reflects the acute inflammatory status or the cirrhosis  PLAN:  Check a microalb: Cr    4- Primary HTN  BP goal <120/80-BP raudel goal thi9s AM  PLAN:  1.  Follow BP      Thank youfor allowing us to participate in care of Librado Narvaez MD  10:18 AM  10/11/2022

## 2022-10-11 NOTE — ED PROVIDER NOTES
Moe Chapman is a 79year old female with PMH of HTN who presented to ED with concern for LE edema and fatigue. Patient has had symptoms present for one week and are worsening. Patient is also having pain over the right leg due to swelling. Patient denies trauma. Patient denies chest pain, shortness of breath, fever, chills, nausea, vomiting, abdominal pain. Nothing makes symptoms better or worse symptoms moderate in severity and constant. Patient denies alcohol use. Pain is an aching, throbbing pain that radiates over both legs worse on the right. Patient is not on anticoagulation and denies hx of VTE. The history is provided by the patient, medical records and a relative. Review of Systems   Constitutional:  Positive for fatigue. Negative for chills, diaphoresis and fever. Eyes:  Negative for photophobia and visual disturbance. Respiratory:  Negative for cough, chest tightness and shortness of breath. Cardiovascular:  Positive for leg swelling. Negative for chest pain and palpitations. Gastrointestinal:  Negative for abdominal distention, abdominal pain, diarrhea, nausea and vomiting. Genitourinary:  Negative for dysuria. Musculoskeletal:  Negative for neck pain and neck stiffness. Skin:  Negative for pallor and rash. Neurological:  Negative for headaches. Psychiatric/Behavioral:  Negative for confusion. Physical Exam  Vitals and nursing note reviewed. Constitutional:       General: She is not in acute distress. Appearance: Normal appearance. She is ill-appearing. HENT:      Head: Normocephalic and atraumatic. Eyes:      Pupils: Pupils are equal, round, and reactive to light. Cardiovascular:      Rate and Rhythm: Normal rate and regular rhythm. Comments: Equal Dp/PT pulses  Pulmonary:      Effort: Pulmonary effort is normal.      Breath sounds: Normal breath sounds. Abdominal:      General: Bowel sounds are normal. There is distension.       Palpations: Abdomen is soft. Tenderness: There is no abdominal tenderness. There is no guarding or rebound. Comments: Appears mildly distended   Musculoskeletal:      Cervical back: Normal range of motion and neck supple. No rigidity. No muscular tenderness. Right lower leg: Edema present. Left lower leg: Edema present. Skin:     General: Skin is warm and dry. Capillary Refill: Capillary refill takes less than 2 seconds. Coloration: Skin is not pale. Findings: No erythema or rash. Neurological:      Mental Status: She is alert and oriented to person, place, and time. Psychiatric:         Mood and Affect: Mood normal.        Procedures     MDM  Number of Diagnoses or Management Options  Cirrhosis of liver with ascites, unspecified hepatic cirrhosis type (HCC)  Elevated bilirubin  Hyponatremia  Leg edema  Urinary tract infection with hematuria, site unspecified  Diagnosis management comments: Danielle Kruse is a 79year old female who presented to ED with concern for LE edema  Patient was found to have hyponatremia presumed new 122, patient also found to have cirrhosis on CT scan and renal abnormality, spoke with nephrology recommended NaCL tablet with PO lasix and 1200cc/day fluid restriction, cortisol and urine electrolytes pending, patient stable and agreeable to admission. Patient denies known liver failure hx of alcohol use history  UA significant for likely UTI cultures pending patient started on rocephin. ED Course as of 10/11/22 0011   Mon Oct 10, 2022   2134 Nephrology recommended 1 tablet sodium chloride, Lasix 20 mg p.o., fluid restriction to 1200 cc/day [SS]      ED Course User Index  [SS] Quin Mao MD       --------------------------------------------- PAST HISTORY ---------------------------------------------  Past Medical History:  has a past medical history of Hypertension. Past Surgical History:  has no past surgical history on file.     Social History:  reports that she quit smoking about 15 years ago. Her smoking use included cigarettes. She has never used smokeless tobacco. She reports that she does not drink alcohol and does not use drugs. Family History: family history is not on file. The patients home medications have been reviewed. Allergies: Patient has no known allergies.     -------------------------------------------------- RESULTS -------------------------------------------------    LABS:  Results for orders placed or performed during the hospital encounter of 10/10/22   CBC with Auto Differential   Result Value Ref Range    WBC 6.7 4.5 - 11.5 E9/L    RBC 4.38 3.50 - 5.50 E12/L    Hemoglobin 8.4 (L) 11.5 - 15.5 g/dL    Hematocrit 29.8 (L) 34.0 - 48.0 %    MCV 68.0 (L) 80.0 - 99.9 fL    MCH 19.2 (L) 26.0 - 35.0 pg    MCHC 28.2 (L) 32.0 - 34.5 %    RDW 18.1 (H) 11.5 - 15.0 fL    Platelets 89 (L) 604 - 450 E9/L    MPV NOT CALC 7.0 - 12.0 fL    Neutrophils % 72.9 43.0 - 80.0 %    Immature Granulocytes % 0.6 0.0 - 5.0 %    Lymphocytes % 16.1 (L) 20.0 - 42.0 %    Monocytes % 9.9 2.0 - 12.0 %    Eosinophils % 0.0 0.0 - 6.0 %    Basophils % 0.5 0.0 - 2.0 %    Neutrophils Absolute 4.86 1.80 - 7.30 E9/L    Immature Granulocytes # 0.04 E9/L    Lymphocytes Absolute 1.07 (L) 1.50 - 4.00 E9/L    Monocytes Absolute 0.66 0.10 - 0.95 E9/L    Eosinophils Absolute 0.00 (L) 0.05 - 0.50 E9/L    Basophils Absolute 0.03 0.00 - 0.20 E9/L    Anisocytosis 1+     Polychromasia 1+     Hypochromia 2+     Poikilocytes 1+     Ovalocytes 1+    Comprehensive Metabolic Panel w/ Reflex to MG   Result Value Ref Range    Sodium 122 (L) 132 - 146 mmol/L    Potassium reflex Magnesium 4.2 3.5 - 5.0 mmol/L    Chloride 88 (L) 98 - 107 mmol/L    CO2 21 (L) 22 - 29 mmol/L    Anion Gap 13 7 - 16 mmol/L    Glucose 261 (H) 74 - 99 mg/dL    BUN 23 6 - 23 mg/dL    Creatinine 1.0 0.5 - 1.0 mg/dL    GFR Non-African American 55 >=60 mL/min/1.73    GFR African American >60     Calcium 9.0 8.6 - 10.2 mg/dL    Total Protein 7.9 6.4 - 8.3 g/dL    Albumin 3.5 3.5 - 5.2 g/dL    Total Bilirubin 1.3 (H) 0.0 - 1.2 mg/dL    Alkaline Phosphatase 89 35 - 104 U/L    ALT 28 0 - 32 U/L    AST 49 (H) 0 - 31 U/L   Troponin   Result Value Ref Range    Troponin, High Sensitivity 16 (H) 0 - 9 ng/L   Brain Natriuretic Peptide   Result Value Ref Range    Pro- (H) 0 - 125 pg/mL   Platelet Confirmation   Result Value Ref Range    Platelet Confirmation CONFIRMED    Urinalysis with Microscopic   Result Value Ref Range    Color, UA DARK YELLOW (A) Straw/Yellow    Clarity, UA Clear Clear    Glucose, Ur 250 (A) Negative mg/dL    Bilirubin Urine MODERATE (A) Negative    Ketones, Urine TRACE (A) Negative mg/dL    Specific Gravity, UA >=1.030 1.005 - 1.030    Blood, Urine Negative Negative    pH, UA 5.5 5.0 - 9.0    Protein, UA 30 (A) Negative mg/dL    Urobilinogen, Urine 1.0 <2.0 E.U./dL    Nitrite, Urine POSITIVE (A) Negative    Leukocyte Esterase, Urine Negative Negative    WBC, UA 0-1 0 - 5 /HPF    RBC, UA 0-1 0 - 2 /HPF    Bacteria, UA FEW (A) None Seen /HPF   Urine electrolytes   Result Value Ref Range    Sodium, Ur <20 Not Established mmol/L    Potassium, Ur 97.8 Not Established mmol/L    Chloride 23 Not Established mmol/L   OSMOLALITY, URINE   Result Value Ref Range    Osmolality, Ur 591 300 - 900 mOsm/kg   Osmolality, Serum   Result Value Ref Range    Osmolality 290 285 - 310 mOsm/Kg   Lipase   Result Value Ref Range    Lipase 92 (H) 13 - 60 U/L   EKG 12 Lead   Result Value Ref Range    Ventricular Rate 86 BPM    Atrial Rate 86 BPM    P-R Interval 162 ms    QRS Duration 74 ms    Q-T Interval 392 ms    QTc Calculation (Bazett) 469 ms    P Axis 39 degrees    R Axis 2 degrees    T Axis 27 degrees       RADIOLOGY:  CT ABDOMEN PELVIS WO CONTRAST Additional Contrast? None   Final Result   1. Liver cirrhosis with mild splenomegaly. Minimal ascites. 2. Liquid stool in the proximal colon indicating diarrheal illness. 3.  Subtly increased renal medullary density, which may be seen from any   cause resulting in increased urine osmolality , most commonly dehydration. However, the differential diagnosis for the \"ense renal medulla sign\" is   wide, and includes: Hyponatremia, high-protein diet, glucosuria, adrenal   insufficiency SIADH, hyperparathyroidism medullary sponge kidneys etc.   Early/mild presentation of medullary nephrocalcinosis may present similarly. Clinical correlation is needed. 4. Small hiatal hernia. 5. Cholelithiasis. US DUP LOWER EXTREMITIES BILATERAL VENOUS   Final Result   No evidence of DVT in either lower extremity. Right anterior thigh mass. RECOMMENDATIONS:   Clinical correlation. Consider MRI or focused ultrasound as clinically   warranted. XR CHEST (2 VW)   Final Result   No acute process. EKG:  This EKG is signed and interpreted by me. Rate: 86  Rhythm: Sinus  Interpretation: non-specific EKG, no ST elevation, anterior infarct  Comparison: no previous EKG available      ------------------------- NURSING NOTES AND VITALS REVIEWED ---------------------------  Date / Time Roomed:  10/10/2022  6:05 PM  ED Bed Assignment:  [id number]    The nursing notes within the ED encounter and vital signs as below have been reviewed.      Patient Vitals for the past 24 hrs:   BP Temp Temp src Pulse Resp SpO2 Height Weight   10/10/22 2241 118/65 97.8 °F (36.6 °C) Oral 64 16 98 % -- --   10/10/22 2218 (!) 102/54 99 °F (37.2 °C) Oral 75 18 100 % -- --   10/10/22 1526 107/68 99.1 °F (37.3 °C) -- 94 16 100 % 5' 6\" (1.676 m) 140 lb (63.5 kg)       Oxygen Saturation Interpretation: Normal    ------------------------------------------ PROGRESS NOTES ------------------------------------------  Re-evaluation(s):  Time: 7pm  Patients symptoms show no change  Repeat physical examination is not changed    Counseling:  I have spoken with the patient and discussed todays results, in

## 2022-10-11 NOTE — ED NOTES
GASTROINTESTINAL - ADULT    • Minimal or absence of nausea and vomiting Progressing        RESPIRATORY - ADULT    • Achieves optimal ventilation and oxygenation Progressing        SAFETY ADULT - FALL    • Free from fall injury Progressing          0200 - A Report to Diana Murphy, care of patient relinquished.       Estelita Ruano, ALVIN  10/10/22 3325

## 2022-10-11 NOTE — PLAN OF CARE
Problem: Discharge Planning  Goal: Discharge to home or other facility with appropriate resources  Outcome: Progressing     Problem: Pain  Goal: Verbalizes/displays adequate comfort level or baseline comfort level  10/11/2022 1807 by Flo Farfan RN  Outcome: Progressing

## 2022-10-11 NOTE — PROGRESS NOTES
Dr. Elías Taylor answering service paged Dr. Elías Taylor for diet order, lab orders and home medication orders. [id number] Dr. Elías Taylor answering service called again to obtain medication orders and diet order. Unable to reach. Answering service paged Dr. Elías Taylor.

## 2022-10-11 NOTE — PLAN OF CARE
Problem: Discharge Planning  Goal: Discharge to home or other facility with appropriate resources  Outcome: Progressing     Problem: Pain  Goal: Verbalizes/displays adequate comfort level or baseline comfort level  Outcome: Progressing  Flowsheets (Taken 10/10/2022 2241)  Verbalizes/displays adequate comfort level or baseline comfort level: Encourage patient to monitor pain and request assistance

## 2022-10-12 LAB
AFP-TUMOR MARKER: 4 NG/ML (ref 0–9)
ALBUMIN SERPL-MCNC: 2.2 G/DL (ref 3.5–4.7)
ALBUMIN SERPL-MCNC: 2.8 G/DL (ref 3.5–5.2)
ALP BLD-CCNC: 73 U/L (ref 35–104)
ALPHA-1 ANTITRYPSIN: 226 MG/DL (ref 90–200)
ALPHA-1-GLOBULIN: 0.4 G/DL (ref 0.2–0.4)
ALPHA-2-GLOBULIN: 0.8 G/DL (ref 0.5–1)
ALT SERPL-CCNC: 21 U/L (ref 0–32)
ANION GAP SERPL CALCULATED.3IONS-SCNC: 7 MMOL/L (ref 7–16)
ANISOCYTOSIS: ABNORMAL
ANTI-MITOCHONDRIAL AB, IFA: NEGATIVE
ANTI-NUCLEAR ANTIBODY (ANA): NEGATIVE
AST SERPL-CCNC: 47 U/L (ref 0–31)
BASOPHILIC STIPPLING: ABNORMAL
BASOPHILS ABSOLUTE: 0.01 E9/L (ref 0–0.2)
BASOPHILS RELATIVE PERCENT: 0.2 % (ref 0–2)
BETA GLOBULIN: 0.9 G/DL (ref 0.8–1.3)
BILIRUB SERPL-MCNC: 0.7 MG/DL (ref 0–1.2)
BUN BLDV-MCNC: 30 MG/DL (ref 6–23)
CALCIUM SERPL-MCNC: 8.4 MG/DL (ref 8.6–10.2)
CHLORIDE BLD-SCNC: 94 MMOL/L (ref 98–107)
CHOLESTEROL, FASTING: 75 MG/DL (ref 0–199)
CO2: 25 MMOL/L (ref 22–29)
CREAT SERPL-MCNC: 0.8 MG/DL (ref 0.5–1)
CREATININE URINE: 141 MG/DL (ref 29–226)
ELECTROPHORESIS: ABNORMAL
EOSINOPHILS ABSOLUTE: 0.01 E9/L (ref 0.05–0.5)
EOSINOPHILS RELATIVE PERCENT: 0.2 % (ref 0–6)
FOLATE: 10.4 NG/ML (ref 4.8–24.2)
GAMMA GLOBULIN: 1.9 G/DL (ref 0.7–1.6)
GFR AFRICAN AMERICAN: >60
GFR NON-AFRICAN AMERICAN: >60 ML/MIN/1.73
GLUCOSE BLD-MCNC: 219 MG/DL (ref 74–99)
HAV IGM SER IA-ACNC: ABNORMAL
HCT VFR BLD CALC: 24.8 % (ref 34–48)
HDLC SERPL-MCNC: 11 MG/DL
HEMOGLOBIN: 7 G/DL (ref 11.5–15.5)
HEPATITIS B CORE IGM ANTIBODY: ABNORMAL
HEPATITIS B SURFACE ANTIGEN INTERPRETATION: ABNORMAL
HEPATITIS C ANTIBODY INTERPRETATION: REACTIVE
HYPOCHROMIA: ABNORMAL
IGG: 1557 MG/DL (ref 700–1600)
IGM: 602 MG/DL (ref 40–230)
IMMATURE GRANULOCYTES #: 0.02 E9/L
IMMATURE GRANULOCYTES %: 0.4 % (ref 0–5)
LDL CHOLESTEROL CALCULATED: 41 MG/DL (ref 0–99)
LIPASE: 66 U/L (ref 13–60)
LYMPHOCYTES ABSOLUTE: 1.25 E9/L (ref 1.5–4)
LYMPHOCYTES RELATIVE PERCENT: 22.7 % (ref 20–42)
MCH RBC QN AUTO: 19.2 PG (ref 26–35)
MCHC RBC AUTO-ENTMCNC: 28.2 % (ref 32–34.5)
MCV RBC AUTO: 67.9 FL (ref 80–99.9)
METER GLUCOSE: 225 MG/DL (ref 74–99)
METER GLUCOSE: 240 MG/DL (ref 74–99)
METER GLUCOSE: 240 MG/DL (ref 74–99)
METER GLUCOSE: 271 MG/DL (ref 74–99)
MICROALBUMIN UR-MCNC: 59.8 MG/L
MICROALBUMIN/CREAT UR-RTO: 42.4 (ref 0–30)
MONOCYTES ABSOLUTE: 0.6 E9/L (ref 0.1–0.95)
MONOCYTES RELATIVE PERCENT: 10.9 % (ref 2–12)
NEUTROPHILS ABSOLUTE: 3.62 E9/L (ref 1.8–7.3)
NEUTROPHILS RELATIVE PERCENT: 65.6 % (ref 43–80)
PDW BLD-RTO: 18.2 FL (ref 11.5–15)
PLATELET # BLD: 75 E9/L (ref 130–450)
PLATELET CONFIRMATION: NORMAL
PMV BLD AUTO: ABNORMAL FL (ref 7–12)
POIKILOCYTES: ABNORMAL
POLYCHROMASIA: ABNORMAL
POTASSIUM SERPL-SCNC: 4 MMOL/L (ref 3.5–5)
RBC # BLD: 3.65 E12/L (ref 3.5–5.5)
SCHISTOCYTES: ABNORMAL
SMOOTH MUSCLE ANTIBODY: NEGATIVE
SODIUM BLD-SCNC: 126 MMOL/L (ref 132–146)
TEAR DROP CELLS: ABNORMAL
TOTAL PROTEIN: 6.6 G/DL (ref 6.4–8.3)
TRIGLYCERIDE, FASTING: 115 MG/DL (ref 0–149)
VITAMIN B-12: 881 PG/ML (ref 211–946)
VLDLC SERPL CALC-MCNC: 23 MG/DL
WBC # BLD: 5.5 E9/L (ref 4.5–11.5)

## 2022-10-12 PROCEDURE — 82962 GLUCOSE BLOOD TEST: CPT

## 2022-10-12 PROCEDURE — 85025 COMPLETE CBC W/AUTO DIFF WBC: CPT

## 2022-10-12 PROCEDURE — 82570 ASSAY OF URINE CREATININE: CPT

## 2022-10-12 PROCEDURE — 80061 LIPID PANEL: CPT

## 2022-10-12 PROCEDURE — 82746 ASSAY OF FOLIC ACID SERUM: CPT

## 2022-10-12 PROCEDURE — 82607 VITAMIN B-12: CPT

## 2022-10-12 PROCEDURE — 36415 COLL VENOUS BLD VENIPUNCTURE: CPT

## 2022-10-12 PROCEDURE — 6360000002 HC RX W HCPCS: Performed by: NURSE PRACTITIONER

## 2022-10-12 PROCEDURE — 84165 PROTEIN E-PHORESIS SERUM: CPT

## 2022-10-12 PROCEDURE — 6370000000 HC RX 637 (ALT 250 FOR IP): Performed by: INTERNAL MEDICINE

## 2022-10-12 PROCEDURE — 86334 IMMUNOFIX E-PHORESIS SERUM: CPT

## 2022-10-12 PROCEDURE — 2060000000 HC ICU INTERMEDIATE R&B

## 2022-10-12 PROCEDURE — 84166 PROTEIN E-PHORESIS/URINE/CSF: CPT

## 2022-10-12 PROCEDURE — 2580000003 HC RX 258: Performed by: NURSE PRACTITIONER

## 2022-10-12 PROCEDURE — 80053 COMPREHEN METABOLIC PANEL: CPT

## 2022-10-12 PROCEDURE — 83690 ASSAY OF LIPASE: CPT

## 2022-10-12 PROCEDURE — 6370000000 HC RX 637 (ALT 250 FOR IP): Performed by: GENERAL PRACTICE

## 2022-10-12 PROCEDURE — 82044 UR ALBUMIN SEMIQUANTITATIVE: CPT

## 2022-10-12 RX ORDER — HYDROCODONE BITARTRATE AND ACETAMINOPHEN 5; 325 MG/1; MG/1
1 TABLET ORAL EVERY 4 HOURS PRN
Status: DISCONTINUED | OUTPATIENT
Start: 2022-10-12 | End: 2022-10-16 | Stop reason: HOSPADM

## 2022-10-12 RX ORDER — SODIUM CHLORIDE 1000 MG
2 TABLET, SOLUBLE MISCELLANEOUS
Status: DISCONTINUED | OUTPATIENT
Start: 2022-10-13 | End: 2022-10-16 | Stop reason: HOSPADM

## 2022-10-12 RX ORDER — MELOXICAM 7.5 MG/1
7.5 TABLET ORAL DAILY
COMMUNITY

## 2022-10-12 RX ORDER — CHLORZOXAZONE 500 MG/1
500 TABLET ORAL 3 TIMES DAILY
COMMUNITY

## 2022-10-12 RX ADMIN — SODIUM CHLORIDE 100 MG: 9 INJECTION, SOLUTION INTRAVENOUS at 16:31

## 2022-10-12 RX ADMIN — Medication 1 G: at 11:05

## 2022-10-12 RX ADMIN — INSULIN LISPRO 2 UNITS: 100 INJECTION, SOLUTION INTRAVENOUS; SUBCUTANEOUS at 11:05

## 2022-10-12 RX ADMIN — INSULIN LISPRO 1 UNITS: 100 INJECTION, SOLUTION INTRAVENOUS; SUBCUTANEOUS at 16:01

## 2022-10-12 RX ADMIN — HYDROCODONE BITARTRATE AND ACETAMINOPHEN 1 TABLET: 5; 325 TABLET ORAL at 20:43

## 2022-10-12 RX ADMIN — HYDROCODONE BITARTRATE AND ACETAMINOPHEN 1 TABLET: 5; 325 TABLET ORAL at 15:00

## 2022-10-12 RX ADMIN — SODIUM CHLORIDE 25 MG: 9 INJECTION, SOLUTION INTRAVENOUS at 15:00

## 2022-10-12 RX ADMIN — Medication 1 G: at 16:01

## 2022-10-12 RX ADMIN — INSULIN LISPRO 1 UNITS: 100 INJECTION, SOLUTION INTRAVENOUS; SUBCUTANEOUS at 06:18

## 2022-10-12 RX ADMIN — HYDROCODONE BITARTRATE AND ACETAMINOPHEN 1 TABLET: 5; 325 TABLET ORAL at 06:49

## 2022-10-12 RX ADMIN — Medication 1 G: at 07:57

## 2022-10-12 ASSESSMENT — PAIN - FUNCTIONAL ASSESSMENT
PAIN_FUNCTIONAL_ASSESSMENT: ACTIVITIES ARE NOT PREVENTED
PAIN_FUNCTIONAL_ASSESSMENT: PREVENTS OR INTERFERES SOME ACTIVE ACTIVITIES AND ADLS
PAIN_FUNCTIONAL_ASSESSMENT: PREVENTS OR INTERFERES SOME ACTIVE ACTIVITIES AND ADLS

## 2022-10-12 ASSESSMENT — PAIN DESCRIPTION - DESCRIPTORS
DESCRIPTORS: THROBBING
DESCRIPTORS: ACHING;DISCOMFORT
DESCRIPTORS: DISCOMFORT;ACHING

## 2022-10-12 ASSESSMENT — PAIN DESCRIPTION - LOCATION
LOCATION: LEG

## 2022-10-12 ASSESSMENT — PAIN DESCRIPTION - ORIENTATION
ORIENTATION: RIGHT

## 2022-10-12 ASSESSMENT — PAIN SCALES - GENERAL
PAINLEVEL_OUTOF10: 6
PAINLEVEL_OUTOF10: 0
PAINLEVEL_OUTOF10: 5
PAINLEVEL_OUTOF10: 6
PAINLEVEL_OUTOF10: 0
PAINLEVEL_OUTOF10: 6

## 2022-10-12 ASSESSMENT — PAIN DESCRIPTION - PAIN TYPE
TYPE: ACUTE PAIN
TYPE: ACUTE PAIN

## 2022-10-12 ASSESSMENT — PAIN DESCRIPTION - FREQUENCY
FREQUENCY: CONTINUOUS
FREQUENCY: CONTINUOUS

## 2022-10-12 ASSESSMENT — PAIN DESCRIPTION - ONSET
ONSET: ON-GOING
ONSET: ON-GOING

## 2022-10-12 NOTE — PROGRESS NOTES
Nephrology Progress Note  Patient's Name: Nicholes Lefort  10:42 AM  10/12/2022    Nephrologist: Niya Macedo    Reason for Consult:  Hyponatremia    History of Present Ilness:  from consult 10/11/22-  Nicholes Lefort is a 79 y.o. female with prior history of HTN on lisinopril as an outpt. Pt presented to the ED with the recent Hx of LE edema and fatigue. She denied CP or SOB. She has no N/V/D. She uses no NSAID. She denies lightheadedness or dizziness. She has no urinary tract symptoms. CT Scan showed cirrhosis with ascites as well as a renal medullary density, Na+ 122, HgB 8.4, PLT 89    10/12/22- awake and alert.  at bedside. Not eating, does not like the food. Past Medical History:   Diagnosis Date    Hypertension        No past surgical history on file. No family history on file. reports that she quit smoking about 15 years ago. Her smoking use included cigarettes. She has never used smokeless tobacco. She reports that she does not drink alcohol and does not use drugs. Allergies:  Patient has no known allergies. Current Medications:    HYDROcodone-acetaminophen (NORCO) 5-325 MG per tablet 1 tablet, Q4H PRN  insulin lispro (HUMALOG) injection vial 0-4 Units, TID WC  insulin lispro (HUMALOG) injection vial 0-4 Units, Nightly  sodium chloride tablet 1 g, TID WC      Review of Systems:   Pertinent items are noted in HPI.     Physical exam:   Constitutional:  Elderly female in NAD  Vitals: VITALS:  /60   Pulse 72   Temp 97.7 °F (36.5 °C) (Oral)   Resp 18   Ht 5' 6\" (1.676 m)   Wt 140 lb (63.5 kg)   SpO2 97%   BMI 22.60 kg/m²   24HR INTAKE/OUTPUT:    Intake/Output Summary (Last 24 hours) at 10/12/2022 1042  Last data filed at 10/12/2022 1023  Gross per 24 hour   Intake --   Output 400 ml   Net -400 ml     URINARY CATHETER OUTPUT ([place]):     DRAIN/TUBE OUTPUT:     VENT SETTINGS:     Additional Respiratory Assessments  Heart Rate: 72  Resp: 18  SpO2: 97 %  Skin: no rash, turgor wnl  Heent: eomi, mmm  Neck: no bruits or jvd noted  Cardiovascular:  PMI not lat displaced S1, S2 without S3  Respiratory: Clear and equal bilaterally  Abdomen:  +bs, soft, nt, nd  Ext: 1-2 pedal and LE edema lower extremity edema  Psychiatric: mood and affect appropriate    Data:   Labs:  CBC:   Lab Results   Component Value Date/Time    WBC 5.5 10/12/2022 02:15 AM    RBC 3.65 10/12/2022 02:15 AM    HGB 7.0 10/12/2022 02:15 AM    HCT 24.8 10/12/2022 02:15 AM    MCV 67.9 10/12/2022 02:15 AM    MCH 19.2 10/12/2022 02:15 AM    MCHC 28.2 10/12/2022 02:15 AM    RDW 18.2 10/12/2022 02:15 AM    PLT 75 10/12/2022 02:15 AM    MPV NOT CALC 10/12/2022 02:15 AM     CBC with Differential:    Lab Results   Component Value Date/Time    WBC 5.5 10/12/2022 02:15 AM    RBC 3.65 10/12/2022 02:15 AM    HGB 7.0 10/12/2022 02:15 AM    HCT 24.8 10/12/2022 02:15 AM    PLT 75 10/12/2022 02:15 AM    MCV 67.9 10/12/2022 02:15 AM    MCH 19.2 10/12/2022 02:15 AM    MCHC 28.2 10/12/2022 02:15 AM    RDW 18.2 10/12/2022 02:15 AM    LYMPHOPCT 22.7 10/12/2022 02:15 AM    MONOPCT 10.9 10/12/2022 02:15 AM    BASOPCT 0.2 10/12/2022 02:15 AM    MONOSABS 0.60 10/12/2022 02:15 AM    LYMPHSABS 1.25 10/12/2022 02:15 AM    EOSABS 0.01 10/12/2022 02:15 AM    BASOSABS 0.01 10/12/2022 02:15 AM     Hemoglobin/Hematocrit:    Lab Results   Component Value Date/Time    HGB 7.0 10/12/2022 02:15 AM    HCT 24.8 10/12/2022 02:15 AM     CMP:    Lab Results   Component Value Date/Time     10/12/2022 02:15 AM    K 4.0 10/12/2022 02:15 AM    K 4.2 10/10/2022 04:16 PM    CL 94 10/12/2022 02:15 AM    CO2 25 10/12/2022 02:15 AM    BUN 30 10/12/2022 02:15 AM    CREATININE 0.8 10/12/2022 02:15 AM    GFRAA >60 10/12/2022 02:15 AM    LABGLOM >60 10/12/2022 02:15 AM    GLUCOSE 219 10/12/2022 02:15 AM    PROT 6.6 10/12/2022 02:15 AM    LABALBU 2.8 10/12/2022 02:15 AM    CALCIUM 8.4 10/12/2022 02:15 AM    BILITOT 0.7 10/12/2022 02:15 AM    ALKPHOS 73 10/12/2022 02:15 AM    AST 47 10/12/2022 02:15 AM    ALT 21 10/12/2022 02:15 AM     BMP:    Lab Results   Component Value Date/Time     10/12/2022 02:15 AM    K 4.0 10/12/2022 02:15 AM    K 4.2 10/10/2022 04:16 PM    CL 94 10/12/2022 02:15 AM    CO2 25 10/12/2022 02:15 AM    BUN 30 10/12/2022 02:15 AM    LABALBU 2.8 10/12/2022 02:15 AM    CREATININE 0.8 10/12/2022 02:15 AM    CALCIUM 8.4 10/12/2022 02:15 AM    GFRAA >60 10/12/2022 02:15 AM    LABGLOM >60 10/12/2022 02:15 AM    GLUCOSE 219 10/12/2022 02:15 AM     Sodium:    Lab Results   Component Value Date/Time     10/12/2022 02:15 AM     Hepatic Function Panel:    Lab Results   Component Value Date/Time    ALKPHOS 73 10/12/2022 02:15 AM    ALT 21 10/12/2022 02:15 AM    AST 47 10/12/2022 02:15 AM    PROT 6.6 10/12/2022 02:15 AM    BILITOT 0.7 10/12/2022 02:15 AM    LABALBU 2.8 10/12/2022 02:15 AM     Albumin:    Lab Results   Component Value Date/Time    LABALBU 2.8 10/12/2022 02:15 AM     Calcium:    Lab Results   Component Value Date/Time    CALCIUM 8.4 10/12/2022 02:15 AM     Ionized Calcium:  No results found for: IONCA  Magnesium:  No results found for: MG  Phosphorus:  No results found for: PHOS  LDH:  No results found for: LDH  Uric Acid:  No results found for: LABURIC, URICACID  PT/INR:    Lab Results   Component Value Date/Time    PROTIME 21.4 10/11/2022 11:10 AM    INR 1.9 10/11/2022 11:10 AM     PTT:  No results found for: APTT, PTT[APTT}  Troponin:  No results found for: TROPONINI  U/A:    Lab Results   Component Value Date/Time    COLORU DARK YELLOW 10/10/2022 07:09 PM    PROTEINU 30 10/10/2022 07:09 PM    PHUR 5.5 10/10/2022 07:09 PM    WBCUA 0-1 10/10/2022 07:09 PM    RBCUA 0-1 10/10/2022 07:09 PM    BACTERIA FEW 10/10/2022 07:09 PM    CLARITYU Clear 10/10/2022 07:09 PM    SPECGRAV >=1.030 10/10/2022 07:09 PM    LEUKOCYTESUR Negative 10/10/2022 07:09 PM    UROBILINOGEN 1.0 10/10/2022 07:09 PM    BILIRUBINUR MODERATE 10/10/2022 07:09 PM    BLOODU Negative 10/10/2022 07:09 PM    GLUCOSEU 250 10/10/2022 07:09 PM     ABG:  No results found for: PH, PCO2, PO2, HCO3, BE, THGB, TCO2, O2SAT  HgBA1c:  No results found for: LABA1C  Microalbumen/Creatinine ratio:  No components found for: RUCREAT  FLP:    Lab Results   Component Value Date/Time    HDL 11 10/12/2022 02:15 AM    LDLCALC 41 10/12/2022 02:15 AM    LABVLDL 23 10/12/2022 02:15 AM     TSH:    Lab Results   Component Value Date/Time    TSH 0.878 10/11/2022 11:10 AM     VITAMIN B12: No components found for: B12  FOLATE:  No results found for: FOLATE  Iron Saturation:  No components found for: PERCENTFE  FERRITIN:    Lab Results   Component Value Date/Time    FERRITIN 25 10/11/2022 11:10 AM     LIPASE:    Lab Results   Component Value Date/Time    LIPASE 66 10/12/2022 02:15 AM     Fibrinogen Level:  No components found for: FIB  24 Hour Urine for Protein:  No components found for: RAWUPRO, UHRS3, ZMGP72TX, UTV3  24 Hour Urine for Creatinine Clearance:  No components found for: CREAT4, UHRS10, UTV10     Imaging:  CT ABDOMEN PELVIS WO CONTRAST Additional Contrast? None [[id number]] Collected: 10/10/22 2126     Order Status: Completed Updated: 10/10/22 2136    Narrative:      EXAMINATION:   CT OF THE ABDOMEN AND PELVIS WITHOUT CONTRAST 10/10/2022 8:05 pm     TECHNIQUE:   CT of the abdomen and pelvis was performed without the administration of   intravenous contrast. Multiplanar reformatted images are provided for review. Automated exposure control, iterative reconstruction, and/or weight based   adjustment of the mA/kV was utilized to reduce the radiation dose to as low   as reasonably achievable.      COMPARISON:   CT of the pelvis, 10/14/2014     HISTORY:   ORDERING SYSTEM PROVIDED HISTORY: elevated bilirubin, ruq tender   TECHNOLOGIST PROVIDED HISTORY:   Reason for exam:->elevated bilirubin, ruq tender   Additional Contrast?->None   Decision Support Exception - unselect if not a suspected or confirmed   emergency medical condition->Emergency Medical Condition (MA)     FINDINGS:   Lower Chest: The lung bases are clear. The heart is normal in size. No   pleural or pericardial effusion. Small hiatal hernia. Relative hypodensity   of the blood in the cardiac chambers consistent with patient's history of   anemia. Organs:     Liver: Irregular liver contour indicating cirrhosis. No focal lesion or   ductal dilatation appreciated. Gallbladder: Multiple small calcified gallstones are seen. No evidence of   pericholecystic edema. Pancreas: Unremarkable. Spleen: The spleen is mildly enlarged, measuring 15.3 cm in length. Adrenals: Unremarkable. Kidneys: Normal in size and contour. Subtly increased renal medullary   density. GI/Bowel: Liquid stool is seen in the proximal colon. No bowel wall   thickening or obstruction. Pelvis: The urinary bladder is unremarkable. Within limits of the CT   technique, the uterus and the adnexa are grossly unremarkable. Peritoneum/Retroperitoneum: Prominent calcified atherosclerosis is seen in   the aorta. No aneurysm. No lymphadenopathy. No free air. Small volume   perihepatic ascites. Bones/Soft Tissues: The visualized bones are intact without fracture or focal   lesion. Prominent loss of disc height at L5-S1. Impression:      1. Liver cirrhosis with mild splenomegaly. Minimal ascites. 2. Liquid stool in the proximal colon indicating diarrheal illness. 3.  Subtly increased renal medullary density, which may be seen from any   cause resulting in increased urine osmolality , most commonly dehydration. However, the differential diagnosis for the \"ense renal medulla sign\" is   wide, and includes: Hyponatremia, high-protein diet, glucosuria, adrenal   insufficiency SIADH, hyperparathyroidism medullary sponge kidneys etc.   Early/mild presentation of medullary nephrocalcinosis may present similarly. Clinical correlation is needed.    4. Small hiatal hernia. 5. Cholelithiasis. XR CHEST (2 VW) [[id number]] Collected: 10/10/22 1818    Order Status: Completed Specimen: Chest Updated: 10/10/22 1821    Narrative:      EXAMINATION:   TWO XRAY VIEWS OF THE CHEST     10/10/2022 4:23 pm     COMPARISON:   None. HISTORY:   ORDERING SYSTEM PROVIDED HISTORY: LE edema   TECHNOLOGIST PROVIDED HISTORY:   Reason for exam:->LE edema     FINDINGS:   The lungs are without acute focal process. There is no effusion or   pneumothorax. The cardiomediastinal silhouette is without acute process. The   osseous structures are without acute process. Impression:      No acute process. Assessment  1-Hyponatremia with a urine Na+<20 suggests a component of decreased effective renal perfusion. Component sec to Chronic Liver Disease. Pt treated with furosemide and NaCl overnight 10/10 now Na 126  PLAN:  1. Follow Na+  2. Continue NaCl 1 gram tid    2-Anemia and Thrombocytopenia in the setting of the Cirrhosis  Hgb 8.4-->7.7->7.0  PLT 89-->81->75K  B12, folate- WNL SPEP and UPEP pending  Fe++ sat 4%  Plan:  Dose IV iron  GI planning to scope tomorrow      3-Proteinuria  possible UTI, possible reflects the acute inflammatory status or the cirrhosis  Microalb: Cr 42.4  Plan:  Follow     4- Primary HTN  BP goal <120/80-BP at goal  PLAN:  1. Follow BP      Thank you for allowing us to participate in care of Lawanda Ribeiro, APRN - CNP  10:42 AM  10/12/2022   Patient seen and examined. I had a face to face encounter with the patient. She states her R leg is still very painful  Agree with exam.    Agree with  formulation, assessment and plan as outlined above and directed by me. Addition and corrections were done as deemed appropriate. My exam and plan include:    A/P:  Hyponatremia-will titrate the NaCl to 2gr tid    2.  Anemia-SPEP (-) for monoclonal protein      Continue current treatment as outlined above    AMINA Narvaez MD

## 2022-10-12 NOTE — PROGRESS NOTES
PROGRESS NOTE        Patient Presents with/Seen in Consultation For      *Reason for Consult: cirrhosis   CHIEF COMPLAINT:  lower leg edema   Subjective:     Patient seen laying in bed in no apparent distress. Tolerating diet. No complaints of abdominal pain or nausea. Had BM \"it looked normal\", occult stool positive. POC d/w pt. Review of Systems  Aside from what was mentioned in the PMH and HPI, essentially unremarkable, all others negative. Objective:     /60   Pulse 72   Temp 97.7 °F (36.5 °C) (Oral)   Resp 18   Ht 5' 6\" (1.676 m)   Wt 140 lb (63.5 kg)   SpO2 97%   BMI 22.60 kg/m²     General appearance: alert, awake, laying in bed, and cooperative  Eyes: conjunctiva pale, sclera anicteric. PERRL.   Lungs: clear to auscultation bilaterally  Heart: regular rate and rhythm, no murmur, 2+ pulses; no edema  Abdomen: soft, non-tender; bowel sounds normal; no masses,  no organomegaly  Extremities: extremities without edema  Pulses: 2+ and symmetric  Skin: Skin color, texture, turgor normal.   Neurologic: Grossly normal    HYDROcodone-acetaminophen (NORCO) 5-325 MG per tablet 1 tablet, Q4H PRN  insulin lispro (HUMALOG) injection vial 0-4 Units, TID WC  insulin lispro (HUMALOG) injection vial 0-4 Units, Nightly  sodium chloride tablet 1 g, TID WC       Data Review  CBC:   Lab Results   Component Value Date/Time    WBC 5.5 10/12/2022 02:15 AM    RBC 3.65 10/12/2022 02:15 AM    HGB 7.0 10/12/2022 02:15 AM    HCT 24.8 10/12/2022 02:15 AM    MCV 67.9 10/12/2022 02:15 AM    MCH 19.2 10/12/2022 02:15 AM    MCHC 28.2 10/12/2022 02:15 AM    RDW 18.2 10/12/2022 02:15 AM    PLT 75 10/12/2022 02:15 AM    MPV NOT CALC 10/12/2022 02:15 AM     CMP:    Lab Results   Component Value Date/Time     10/12/2022 02:15 AM    K 4.0 10/12/2022 02:15 AM    K 4.2 10/10/2022 04:16 PM    CL 94 10/12/2022 02:15 AM    CO2 25 10/12/2022 02:15 AM    BUN 30 10/12/2022 02:15 AM    CREATININE 0.8 10/12/2022 02:15 AM    GFRAA >60 10/12/2022 02:15 AM    LABGLOM >60 10/12/2022 02:15 AM    GLUCOSE 219 10/12/2022 02:15 AM    PROT 6.6 10/12/2022 02:15 AM    LABALBU 2.8 10/12/2022 02:15 AM    CALCIUM 8.4 10/12/2022 02:15 AM    BILITOT 0.7 10/12/2022 02:15 AM    ALKPHOS 73 10/12/2022 02:15 AM    AST 47 10/12/2022 02:15 AM    ALT 21 10/12/2022 02:15 AM     Hepatic Function Panel:    Lab Results   Component Value Date/Time    ALKPHOS 73 10/12/2022 02:15 AM    ALT 21 10/12/2022 02:15 AM    AST 47 10/12/2022 02:15 AM    PROT 6.6 10/12/2022 02:15 AM    BILITOT 0.7 10/12/2022 02:15 AM    LABALBU 2.8 10/12/2022 02:15 AM     No components found for: CHLPL  No results found for: TRIG    Lab Results   Component Value Date    HDL 11 10/12/2022       Lab Results   Component Value Date    LDLCALC 41 10/12/2022       Lab Results   Component Value Date    LABVLDL 23 10/12/2022      PT/INR:    Lab Results   Component Value Date/Time    PROTIME 21.4 10/11/2022 11:10 AM    INR 1.9 10/11/2022 11:10 AM     IRON:    Lab Results   Component Value Date/Time    IRON 13 10/11/2022 11:10 AM     Iron Saturation:  No components found for: PERCENTFE  FERRITIN:    Lab Results   Component Value Date/Time    FERRITIN 25 10/11/2022 11:10 AM         Assessment:     Active Problems:  Liver Cirrhosis  Ascites, minimal on CT  Bilateral lower leg edema   Cholelithiasis   Hyponatremia   Elevated lipase   Anemia, microcytic   Thrombocytopenia   Elevated CEA 10.8    Plan:   Liver serology pending  Iron studies noted   Hepatitis panel pending   Fasting lipids noted  Diet as tolerated  Occult stool, positive  Endoscopic work up Friday 10/14/22  Clear diet after midnight   Monitor stools  Monitor CBC, CMP, INR, ammonia and lipase daily   Supportive care  Continue to monitor     Note: This report was completed utilizing computer voice recognition software. Every effort has been made to ensure accuracy, however; inadvertent computerized transcription errors may be present.      Discussed with Dr. Sara Barbosa per Dr. Jones Pitts APRPATRICIA-NP-C 10/12/2022  10:59 AM For Dr. Elicia Leonardo

## 2022-10-12 NOTE — CARE COORDINATION
CASE MANAGEMENT. .. Met with patient at the bedside. Mrs Annie Cheng is independent from home with her  and son. She is on ssi here, but is not on insulin at home. No h/o DME/GABRIELLA. Renal/GI/Sx/Ortho on board. Monitoring labs closely and treating accordingly. Started on NaCl tabs. Await input from consults. Will cont to follow along and assist with needs accordingly.

## 2022-10-12 NOTE — PROGRESS NOTES
Patient seen sodium improved. Noted to have anemia and elevated cea, will hav e surgery see. Mass of r  thigh on mri, likely need biopsied or removed . Will have ortho see. gi following for cirrhosis.  Serologies pending

## 2022-10-12 NOTE — H&P
[id number] [address]                              HISTORY AND PHYSICAL    PATIENT NAME: Arturo Hernandez                          :        1955  MED REC NO:   [id number]                            ROOM:       [id number]  ACCOUNT NO:   [de-identified]                           ADMIT DATE: 10/10/2022  PROVIDER:     Fiorella Samuel DO    HISTORY OF PRESENT ILLNESS:  This is a 60-year-old white female, who was  seen initially in the office with a history and chief complaint of a  painful swollen lump on her right thigh. It was firm. It was tender. There was some right lower extremity edema. Etiology of this mass was  undetermined, so she was subsequently sent for x-rays and x-rays were  pending; however, the patient complained of persistent pain and  presented to the emergency department for further evaluation. She had a  normal white count on admission. She had a low hemoglobin and  hematocrit with microcytic indices; 8.4 and 29.8 respectively on her H  and H. MCV 68 and MCHC 19.2. She was also noted to have a hyponatremic  hypochloremic metabolic panel with sodium of 122 and chloride of 88. Total bilirubin was up slightly at 1.3. Troponin was 16. ProBNP was  518. 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 Ultrasound of the lower extremity was ordered and it was  negative for DVT. The right anterior mass was noted. EKG was okay. Vital signs seemed to be stable. She was subsequently admitted with a  provisional diagnoses of hyponatremia, right thigh mass, and possibly  cirrhosis of the liver and these will be worked up while in the  hospital.    PAST MEDICAL HISTORY:  Significant for hypertension. PAST SURGICAL HISTORY:  No surgical history on file. SOCIAL HISTORY:  She is a previous smoker.   She does not drink, however,  states that she did drink in the past.    FAMILY MEDICAL HISTORY:  Was noted and discussed. ALLERGIES:  She has no known allergies. MEDICATIONS:  Prior to admission included lisinopril. REVIEW OF SYSTEMS:  Negative for vertigo, cephalgia, ringing in the  ears, hearing loss, difficult swallowing, hoarseness in her voice, or  bloody noses. She has no chest pain, palpitations, orthopnea, or  paroxysmal nocturnal dyspnea. There is no cough, wheeze, shortness of  breath, hemoptysis, or pleuritic pain. There is no nausea, vomiting,  diarrhea, constipation, or change in her weight. She denies any blood  in her stool or black tarry stools. There is no urgency, frequency,  dysuria, or hematuria. The endocrine system is negative for diabetes or  thyroid disorder. Blood sugars are elevated, this will have to be  followed. Psychiatric system negative for anxiety or depression. Neurologic system negative for CVA, seizures, tremor, tics, or TIAs. Skin is without rash, eruptions, urticaria, or pruritus. Notable  history positive for right thigh mass. PHYSICAL EXAMINATION:  GENERAL:  Exam shows this to be a 49-year-old white female, in moderate  distress with the above complaints. HEENT:  Head, eyes, ears, nose, and throat examination shows the head to  be normocephalic and atraumatic. The pupils are equal and reactive to  light and accommodation. Extraocular eye muscles are intact. Tympanic  membranes are clear. Ear canals are patent. Oral mucosa pink and  moist.  NECK:  Veins are flat and nondistended. No carotid bruits. CHEST:  Symmetrical.  HEART:  Had a regular rate and rhythm without murmur, gallops, or  friction rub. LUNGS:  Clear to auscultation bilaterally without rhonchi, rales, or  wheezes. ABDOMEN:  Slightly distended. Mild tenderness noted. No rebound. No  rigidity. EXTERNAL GENITALIA:  Appeared to be intact. MUSCULOSKELETAL:  Peripheral pulses are diminished.   Legs show the right  thigh mass. Diminished pulses in right lower extremity. SKIN:  Color is pale. Turgor is fair. No apparent jaundice. ADMITTING DIAGNOSES:  Hyponatremia, right thigh mass, and possibly  cirrhosis of the liver. PLAN:  We are going to go ahead and get Nephrology onboard for  hyponatremia. Restrict her fluids at this point. Further evaluate this  right thigh mass with an MRI. We are going to see how this all plans  out. Further orders will be written for her as her clinical course  dictates. At the time of admission, her condition is fair. Her  prognosis is guarded.         Shari Holloway DO    D: 10/12/2022 8:38:52       T: 10/12/2022 8:42:30     [email or web address]  Job#: [id number]     Doc#: [id number]    CC:

## 2022-10-12 NOTE — PLAN OF CARE
Problem: Discharge Planning  Goal: Discharge to home or other facility with appropriate resources  10/11/2022 2259 by Severo Smiles, RN  Outcome: Progressing  10/11/2022 1807 by Cristi Yarbrough RN  Outcome: Progressing     Problem: Pain  Goal: Verbalizes/displays adequate comfort level or baseline comfort level  10/11/2022 2259 by Severo Smiles, RN  Outcome: Progressing  10/11/2022 1807 by Cristi Yarbrough RN  Outcome: Progressing  10/11/2022 1807 by Cristi Yarbrough RN  Outcome: Progressing

## 2022-10-12 NOTE — PLAN OF CARE
Problem: Discharge Planning  Goal: Discharge to home or other facility with appropriate resources  10/12/2022 0530 by Delfino Ortiz RN  Outcome: Progressing  10/11/2022 2259 by Dick Calderon RN  Outcome: Progressing  10/11/2022 1807 by Law Higginbotham RN  Outcome: Progressing     Problem: Pain  Goal: Verbalizes/displays adequate comfort level or baseline comfort level  10/12/2022 0530 by Delfino Ortiz RN  Outcome: Progressing  10/11/2022 2259 by Dick Calderon RN  Outcome: Progressing  10/11/2022 1807 by Law Higginbotham RN  Outcome: Progressing  10/11/2022 1807 by Law Higginbotham RN  Outcome: Progressing

## 2022-10-12 NOTE — PLAN OF CARE
Problem: Discharge Planning  Goal: Discharge to home or other facility with appropriate resources  10/12/2022 1802 by Valerio Welsh RN  Outcome: Progressing  10/12/2022 0530 by Rubens Singh RN  Outcome: Progressing     Problem: Pain  Goal: Verbalizes/displays adequate comfort level or baseline comfort level  10/12/2022 1802 by Valerio Welsh RN  Outcome: Progressing  10/12/2022 0530 by Rubens Singh RN  Outcome: Progressing

## 2022-10-12 NOTE — PROGRESS NOTES
[place] notified of consult via the office.    Surgical resident notified of consult via secure text  Dr Conner Harrington notified of consult via [facility]

## 2022-10-12 NOTE — CONSULTS
GENERAL SURGERY  CONSULT NOTE  10/12/2022    Physician Consulted: Dr. Danny Constantino   Reason for Consult: Elevated CEA, Acute blood loss anemia with FOBT+ stool    HPI  Ayana Roldan is a 79 y.o. female with hx of hypertension presented for evaluation of new onset bilateral lower extremity edema over the past two weeks, she was found to have ascites and newly diagnosed cirrhosis. She denies abdominal pain, weight loss, nausea, vomiting, dark or bloody bowel movements. She has no prior history of liver disease. 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 She smoked cigarettes as well, pack per day, but quit 17 years ago. She has had no unintentional weight loss. No history of cancer in her family. Patient has never had an EGD or colonoscopy up to this point. Only past surgical history is appendectomy. Afebrile, hemodynamically stable    Na 126, glucose 219   ALT/AST/Bili 21/47/0.7 (bili 1.3 on admission down to 0.7) Lipase 92 to 66  Wbc 5.5, platelets 75 Hb 7 (down-trending from 8.4 on admission)  INR 1.9   CEA 10.8, MELD 23  CTAP, no contrast so difficult to interpret, there is a small amount of ascites around liver and free pelvic fluid. No evidence of obstruction. Retained stool in the colon. Past Medical History:   Diagnosis Date    Hypertension        No past surgical history on file. Medications Prior to Admission    Prior to Admission medications    Medication Sig Start Date End Date Taking? Authorizing Provider   lisinopril (PRINIVIL;ZESTRIL) 10 MG tablet Take 10 mg by mouth daily. Historical Provider, MD   ibuprofen (ADVIL;MOTRIN) 800 MG tablet Take 1 tablet by mouth every 8 hours as needed for Pain. 10/14/14   Annabella Recinos MD       No Known Allergies    No family history on file.     Social History     Tobacco Use    Smoking status: Former     Types: Cigarettes     Quit date: 3/21/2007     Years since quitting: 15.5    Smokeless tobacco: Never   Substance Use Topics    Alcohol use: No    Drug use: No         Review of Systems: pertinent ROS listed in HPI, all others negative       PHYSICAL EXAM:    Vitals:    10/12/22 0652   BP: 123/60   Pulse: 72   Resp: 18   Temp: 97.7 °F (36.5 °C)   SpO2: 97%       GENERAL:  NAD. A&Ox3. HEAD:  Normocephalic. Atraumatic. EYES:   No scleral icterus. PERRL. LUNGS:  No increased work of breathing. CARDIOVASCULAR: RR  ABDOMEN:  Soft, non-tender. No guarding, rigidity, rebound. Mildly distended. EXTREMITIES:   MAEx4. Atraumatic. No LE edema. Right anterior thigh palpable mass that is tender to palpation without overlying sking changes. SKIN:  Warm and dry  NEUROLOGIC:  GCS   RECTAL: No hemorrhoids. No pita blood, FOBT +      ASSESSMENT/PLAN:  79 y.o. female with new onset cirrohsis with peripheral edema and ascites, elevated CEA and FOBT + stools. Concern for colonic malignancy vs. GIB    CEA 10.8  GI following- liver serology pending, hepatitis panel pending, lasix and aldactone   Follow for remainder of tumor markers which are currently pending  Colonoscopy and EGD is planned with GI on 10/14 with GI   Ortho consulted for thigh mass  Follow-up for results of above  No acute surgical intervention currently planned    Plan discussed with Dr. Jh Hernandez.     Nikkie Law DO  Surgery Resident PGY-2  10/12/2022  12:34 PM

## 2022-10-13 ENCOUNTER — ANESTHESIA EVENT (OUTPATIENT)
Dept: ENDOSCOPY | Age: 67
DRG: 641 | End: 2022-10-13
Payer: MEDICARE

## 2022-10-13 ENCOUNTER — APPOINTMENT (OUTPATIENT)
Dept: ULTRASOUND IMAGING | Age: 67
DRG: 641 | End: 2022-10-13
Payer: MEDICARE

## 2022-10-13 LAB
ALBUMIN SERPL-MCNC: 2.7 G/DL (ref 3.5–5.2)
ALP BLD-CCNC: 83 U/L (ref 35–104)
ALT SERPL-CCNC: 25 U/L (ref 0–32)
AMMONIA: 30.4 UMOL/L (ref 11–51)
ANION GAP SERPL CALCULATED.3IONS-SCNC: 7 MMOL/L (ref 7–16)
ANISOCYTOSIS: ABNORMAL
AST SERPL-CCNC: 64 U/L (ref 0–31)
BASOPHILS ABSOLUTE: 0.02 E9/L (ref 0–0.2)
BASOPHILS RELATIVE PERCENT: 0.4 % (ref 0–2)
BILIRUB SERPL-MCNC: 0.8 MG/DL (ref 0–1.2)
BUN BLDV-MCNC: 26 MG/DL (ref 6–23)
CALCIUM SERPL-MCNC: 8.3 MG/DL (ref 8.6–10.2)
CHLORIDE BLD-SCNC: 99 MMOL/L (ref 98–107)
CO2: 25 MMOL/L (ref 22–29)
CREAT SERPL-MCNC: 0.6 MG/DL (ref 0.5–1)
EOSINOPHILS ABSOLUTE: 0.04 E9/L (ref 0.05–0.5)
EOSINOPHILS RELATIVE PERCENT: 0.7 % (ref 0–6)
GFR AFRICAN AMERICAN: >60
GFR SERPL CREATININE-BSD FRML MDRD: 100 ML/MIN/1.73
GLUCOSE BLD-MCNC: 186 MG/DL (ref 74–99)
HCT VFR BLD CALC: 25.7 % (ref 34–48)
HEMOGLOBIN: 7.2 G/DL (ref 11.5–15.5)
HYPOCHROMIA: ABNORMAL
IGG 1: 813 MG/DL (ref 240–1118)
IGG 2: 436 MG/DL (ref 124–549)
IGG 3: 69 MG/DL (ref 21–134)
IGG 4: 61 MG/DL (ref 1–123)
IMMATURE GRANULOCYTES #: 0.06 E9/L
IMMATURE GRANULOCYTES %: 1.1 % (ref 0–5)
INR BLD: 1.7
LIPASE: 117 U/L (ref 13–60)
LYMPHOCYTES ABSOLUTE: 1.01 E9/L (ref 1.5–4)
LYMPHOCYTES RELATIVE PERCENT: 18.2 % (ref 20–42)
MCH RBC QN AUTO: 19.5 PG (ref 26–35)
MCHC RBC AUTO-ENTMCNC: 28 % (ref 32–34.5)
MCV RBC AUTO: 69.5 FL (ref 80–99.9)
METER GLUCOSE: 219 MG/DL (ref 74–99)
METER GLUCOSE: 226 MG/DL (ref 74–99)
METER GLUCOSE: 242 MG/DL (ref 74–99)
METER GLUCOSE: 273 MG/DL (ref 74–99)
MONOCYTES ABSOLUTE: 0.49 E9/L (ref 0.1–0.95)
MONOCYTES RELATIVE PERCENT: 8.8 % (ref 2–12)
NEUTROPHILS ABSOLUTE: 3.94 E9/L (ref 1.8–7.3)
NEUTROPHILS RELATIVE PERCENT: 70.8 % (ref 43–80)
ORGANISM: ABNORMAL
PDW BLD-RTO: 18.4 FL (ref 11.5–15)
PLATELET # BLD: 79 E9/L (ref 130–450)
PLATELET CONFIRMATION: NORMAL
PMV BLD AUTO: 10 FL (ref 7–12)
POLYCHROMASIA: ABNORMAL
POTASSIUM SERPL-SCNC: 3.9 MMOL/L (ref 3.5–5)
PROTHROMBIN TIME: 19 SEC (ref 9.3–12.4)
RBC # BLD: 3.7 E12/L (ref 3.5–5.5)
SODIUM BLD-SCNC: 131 MMOL/L (ref 132–146)
TOTAL PROTEIN: 6.6 G/DL (ref 6.4–8.3)
URINE CULTURE, ROUTINE: ABNORMAL
WBC # BLD: 5.6 E9/L (ref 4.5–11.5)

## 2022-10-13 PROCEDURE — 6360000002 HC RX W HCPCS: Performed by: SPECIALIST

## 2022-10-13 PROCEDURE — 80053 COMPREHEN METABOLIC PANEL: CPT

## 2022-10-13 PROCEDURE — 2060000000 HC ICU INTERMEDIATE R&B

## 2022-10-13 PROCEDURE — 85025 COMPLETE CBC W/AUTO DIFF WBC: CPT

## 2022-10-13 PROCEDURE — 82140 ASSAY OF AMMONIA: CPT

## 2022-10-13 PROCEDURE — 89051 BODY FLUID CELL COUNT: CPT

## 2022-10-13 PROCEDURE — 87186 SC STD MICRODIL/AGAR DIL: CPT

## 2022-10-13 PROCEDURE — 36415 COLL VENOUS BLD VENIPUNCTURE: CPT

## 2022-10-13 PROCEDURE — 6370000000 HC RX 637 (ALT 250 FOR IP): Performed by: NURSE PRACTITIONER

## 2022-10-13 PROCEDURE — 87522 HEPATITIS C REVRS TRNSCRPJ: CPT

## 2022-10-13 PROCEDURE — 6370000000 HC RX 637 (ALT 250 FOR IP): Performed by: GENERAL PRACTICE

## 2022-10-13 PROCEDURE — 2580000003 HC RX 258: Performed by: SPECIALIST

## 2022-10-13 PROCEDURE — 87077 CULTURE AEROBIC IDENTIFY: CPT

## 2022-10-13 PROCEDURE — 87205 SMEAR GRAM STAIN: CPT

## 2022-10-13 PROCEDURE — 87902 NFCT AGT GNTYP ALYS HEP C: CPT

## 2022-10-13 PROCEDURE — 83690 ASSAY OF LIPASE: CPT

## 2022-10-13 PROCEDURE — 85610 PROTHROMBIN TIME: CPT

## 2022-10-13 PROCEDURE — 6370000000 HC RX 637 (ALT 250 FOR IP): Performed by: INTERNAL MEDICINE

## 2022-10-13 PROCEDURE — 87070 CULTURE OTHR SPECIMN AEROBIC: CPT

## 2022-10-13 PROCEDURE — 2709999900 US GUIDED NEEDLE PLACEMENT

## 2022-10-13 PROCEDURE — 82962 GLUCOSE BLOOD TEST: CPT

## 2022-10-13 PROCEDURE — 0J9L3ZX DRAINAGE OF RIGHT UPPER LEG SUBCUTANEOUS TISSUE AND FASCIA, PERCUTANEOUS APPROACH, DIAGNOSTIC: ICD-10-PCS | Performed by: RADIOLOGY

## 2022-10-13 RX ORDER — PANTOPRAZOLE SODIUM 40 MG/1
40 TABLET, DELAYED RELEASE ORAL
Status: DISCONTINUED | OUTPATIENT
Start: 2022-10-14 | End: 2022-10-15

## 2022-10-13 RX ADMIN — POLYETHYLENE GLYCOL-3350 AND ELECTROLYTES 2000 ML: 236; 6.74; 5.86; 2.97; 22.74 POWDER, FOR SOLUTION ORAL at 13:34

## 2022-10-13 RX ADMIN — BISACODYL 30 MG: 5 TABLET, COATED ORAL at 17:17

## 2022-10-13 RX ADMIN — INSULIN LISPRO 1 UNITS: 100 INJECTION, SOLUTION INTRAVENOUS; SUBCUTANEOUS at 16:27

## 2022-10-13 RX ADMIN — HYDROCODONE BITARTRATE AND ACETAMINOPHEN 1 TABLET: 5; 325 TABLET ORAL at 10:29

## 2022-10-13 RX ADMIN — INSULIN LISPRO 2 UNITS: 100 INJECTION, SOLUTION INTRAVENOUS; SUBCUTANEOUS at 10:19

## 2022-10-13 RX ADMIN — HYDROCODONE BITARTRATE AND ACETAMINOPHEN 1 TABLET: 5; 325 TABLET ORAL at 21:33

## 2022-10-13 RX ADMIN — Medication 2 G: at 10:19

## 2022-10-13 RX ADMIN — HYDROCODONE BITARTRATE AND ACETAMINOPHEN 1 TABLET: 5; 325 TABLET ORAL at 16:26

## 2022-10-13 RX ADMIN — WATER 1000 MG: 1 INJECTION INTRAMUSCULAR; INTRAVENOUS; SUBCUTANEOUS at 19:58

## 2022-10-13 RX ADMIN — INSULIN LISPRO 1 UNITS: 100 INJECTION, SOLUTION INTRAVENOUS; SUBCUTANEOUS at 06:13

## 2022-10-13 RX ADMIN — Medication 2 G: at 13:46

## 2022-10-13 RX ADMIN — HYDROCODONE BITARTRATE AND ACETAMINOPHEN 1 TABLET: 5; 325 TABLET ORAL at 04:41

## 2022-10-13 RX ADMIN — Medication 2 G: at 16:29

## 2022-10-13 ASSESSMENT — PAIN SCALES - GENERAL
PAINLEVEL_OUTOF10: 5
PAINLEVEL_OUTOF10: 4
PAINLEVEL_OUTOF10: 6

## 2022-10-13 ASSESSMENT — PAIN DESCRIPTION - ORIENTATION: ORIENTATION: RIGHT

## 2022-10-13 ASSESSMENT — PAIN DESCRIPTION - DESCRIPTORS: DESCRIPTORS: ACHING;DISCOMFORT;SORE

## 2022-10-13 ASSESSMENT — PAIN DESCRIPTION - LOCATION: LOCATION: LEG

## 2022-10-13 ASSESSMENT — LIFESTYLE VARIABLES: SMOKING_STATUS: 0

## 2022-10-13 NOTE — PROGRESS NOTES
GENERAL SURGERY  PROGRESS NOTE  10/12/2022     Physician Consulted: Dr. Edi Burrows   Reason for Consult: Elevated CEA, Acute blood loss anemia with FOBT+ stool     HPI  Patient denies complaints today. Tolerating diet. Tender over right anterior thigh. Afebrile, hemodynamically stable    Na 131, glucose 186   ALT/AST/Bili 25/30/0.8 (bili 1.3 on admission down to 0.8) Lipase 117 up from 66  Wbc 5.6, platelets 79 Hb 7.2 (down-trending from 8.4 on admission)  INR 1.7   CEA 10.8, MELD 23  CTAP, no contrast so difficult to interpret, there is a small amount of ascites around liver and free pelvic fluid. No evidence of obstruction. Retained stool in the colon. PHYSICAL EXAM:      Vitals:    10/13/22 0115   BP: 117/61   Pulse: 68   Resp: 18   Temp: 98.4 °F (36.9 °C)   SpO2: 98%          GENERAL:  NAD. A&Ox3. HEAD:  Normocephalic. Atraumatic. EYES:   No scleral icterus. PERRL. LUNGS:  No increased work of breathing. CARDIOVASCULAR: RR  ABDOMEN:  Soft, non-tender. No guarding, rigidity, rebound. Mildly distended. EXTREMITIES:   MAEx4. Atraumatic. No LE edema. Right anterior thigh palpable mass that is tender to palpation without overlying sking changes. SKIN:  Warm and dry  NEUROLOGIC:  GCS   RECTAL: No hemorrhoids. No pita blood, FOBT +        ASSESSMENT/PLAN:  79 y.o. female with new onset cirrohsis with peripheral edema and ascites, elevated CEA and FOBT + stools. Concern for colonic malignancy vs. GIB, cirrohsis 2/2 hepatitis c infection      CEA 10.8  GI following- liver serology pending, hepatitis panel pending, lasix and aldactone -- Hep C positive.    Monitor lipase- up to 117 today from 47  Follow for remainder of tumor markers which are currently pending  Colonoscopy and EGD is planned with GI on 10/14 with GI   Ortho consulted for thigh mass recs pending  No acute surgical intervention currently planned          Electronically signed by Luba Zarate DO on 10/13/2022 at 6:31 AM    Patient was seen and examined and the chart was reviewed. The patient is being evaluated by both GI and orthopedic surgery.

## 2022-10-13 NOTE — PROGRESS NOTES
Looks like hep c . Checking viral loads. Sodium improved.  For pan endoscopy in am. Likely to se ortho today

## 2022-10-13 NOTE — PLAN OF CARE
Problem: Discharge Planning  Goal: Discharge to home or other facility with appropriate resources  10/12/2022 2243 by Dunia Kowalski RN  Outcome: Progressing     Problem: Pain  Goal: Verbalizes/displays adequate comfort level or baseline comfort level  10/12/2022 2243 by Dunia Kowalski RN  Outcome: Progressing

## 2022-10-13 NOTE — PROGRESS NOTES
Spoke with interventional radiology RN, Reny Castellanos. He stated that lab stickers do not need to be sent down with patient because IR physician will put in his own lab orders.

## 2022-10-13 NOTE — CONSULTS
Orthopaedic Consultation  Trace Ramirez DO      CHIEF COMPLAINT: Pain in right thigh    History of Present Illness: This patient is a 75-year-old female. She was recently admitted with ongoing pain in the right thigh. She was sent to the hospital for MRI of pain and swelling in the thigh. She has no previous history of cancer or primary lesion. Apparently, during her work-up upon admission, she was found to be hyponatremic as well as to have positive occult blood in the stool. She admits no previous history of known blood in her stool, diarrhea, weight gain or weight loss. She has had no recent fevers, chills, nausea or vomiting. She was told that she is positive for hepatitis C, which she also denies having previous history of. She denies drug use or blood transfusion in the past.  She previously smoked as well as drink, but has not done either for about 17 years. She feels as though the pain in her thigh began when she stretched her leg in an abnormal fashion, approximately 2 weeks ago. Past Medical History:   Diagnosis Date    Hypertension          No past surgical history on file. Medications Prior to Admission:    Medications Prior to Admission: meloxicam (MOBIC) 7.5 MG tablet, Take 7.5 mg by mouth daily  chlorzoxazone (PARAFON FORTE) 500 MG tablet, Take 500 mg by mouth 3 times daily  lisinopril (PRINIVIL;ZESTRIL) 10 MG tablet, Take 10 mg by mouth daily. ibuprofen (ADVIL;MOTRIN) 800 MG tablet, Take 1 tablet by mouth every 8 hours as needed for Pain. Allergies:    Patient has no known allergies. Social History:    reports that she quit smoking about 15 years ago. Her smoking use included cigarettes. She has never used smokeless tobacco. She reports that she does not drink alcohol and does not use drugs. Family History:   family history is not on file.     REVIEW OF SYSTEMS:  As above in the HPI, otherwise negative    PHYSICAL EXAM:    Vitals:  /68   Pulse 60   Temp 97.7 °F (36.5 °C) (Oral)   Resp 16   Ht 5' 6\" (1.676 m)   Wt 140 lb (63.5 kg)   SpO2 97%   BMI 22.60 kg/m²     General:  Awake, alert, oriented X 3. Well developed, well nourished, well groomed. No apparent distress. HEENT:  Normocephalic, atraumatic. Pupils equal, round, reactive to light. No scleral icterus. No conjunctival injection. Normal lips, teeth, and gums. No nasal discharge. Neck:  Supple  Heart:  RRR, no murmurs, gallops, or rubs  Lungs:  CTA bilaterally, bilat symmetrical expansion, no wheeze, rales, or rhonchi  Abdomen: Bowel sounds present, soft, nontender, no masses, no organomegaly, no peritoneal signs  Extremities: Right proximal thigh is firm and somewhat indurated though all compartments are easily compressible. There is a palpable area of soreness consistent with underlying mass about the proximal third of the thigh within the anterior compartment. Sensation is grossly intact light touch, L3-S1. Patient dorsiflexes/plantar flexes, demonstrates full 5 out of 5 EHL strength with no difficulty. She can perform a straight leg raise with no difficulty as well. Skin:  Warm and dry, no open lesions or rash  Neuro:  Cranial nerves 2-12 intact, no focal deficits  Breast: deferred  Rectal: deferred  Genitalia:  deferred    LABS:  CBC:   Lab Results   Component Value Date/Time    WBC 5.6 10/13/2022 03:30 AM    RBC 3.70 10/13/2022 03:30 AM    HGB 7.2 10/13/2022 03:30 AM    HCT 25.7 10/13/2022 03:30 AM    MCV 69.5 10/13/2022 03:30 AM    MCH 19.5 10/13/2022 03:30 AM    MCHC 28.0 10/13/2022 03:30 AM    RDW 18.4 10/13/2022 03:30 AM    PLT 79 10/13/2022 03:30 AM    MPV 10.0 10/13/2022 03:30 AM         ASSESSMENT:      Patient Active Problem List   Diagnosis    Fall from other slipping, tripping, or stumbling    Groin strain    Contusion    Hyponatremia       PLAN:    MRI of the right femur was reviewed. Date of the study is 10/12/2022.   There is a lobular, poorly differentiated cystic type lesion within the vastus lateralis of the anterior thigh. There is extensive surrounding edema    I had a long discussion with the patient and her family today. She has no primary cancer history, though her MRI is concerning for possible metastatic lesion. CT of her abdomen and pelvis  Was obtained upon admission but was normal for any underlying malignancy. Chest x-ray was obtained and is also normal.  However, her fecal occult blood test was positive. She is set to undergo colonoscopy tomorrow. We will wait for the results of this test.     Differential diagnosis for this lesion includes infection versus soft tissue sarcoma with likely metastatic origin. At this time, we will obtain a consult to interventional radiology. If this is found to be a purulent collection, would recommend drainage with culture, sensitivity, cell count and infectious disease consult. If this is in fact found to be metastatic, would recommend large sample be obtained and submitted for biopsy. Will follow    Of note greater than 50 minutes were spent on the floor and with patient performing H&P, reviewing labs and imaging, and discussing plan. Half the time was spent counseling and coordinating care.     Juanita Orta, [address] [facility] Orthopaedic [facility]

## 2022-10-13 NOTE — PROGRESS NOTES
10/13/22 1057    URINARY CATHETER OUTPUT (Graham):     DRAIN/TUBE OUTPUT:     VENT SETTINGS:     Additional Respiratory Assessments  Heart Rate: 60  Resp: 16  SpO2: 97 %  Skin: no rash, turgor wnl  Heent:  eomi, mmm  Neck: no bruits or jvd noted  Cardiovascular:  PMI not lat displaced S1, S2 without S3  Respiratory: Clear and equal bilaterally  Abdomen:  +bs, soft, nt, nd  Ext: 1-2 pedal and LE edema lower extremity edema  Psychiatric: mood and affect appropriate    Data:   Labs:  CBC:   Lab Results   Component Value Date/Time    WBC 5.6 10/13/2022 03:30 AM    RBC 3.70 10/13/2022 03:30 AM    HGB 7.2 10/13/2022 03:30 AM    HCT 25.7 10/13/2022 03:30 AM    MCV 69.5 10/13/2022 03:30 AM    MCH 19.5 10/13/2022 03:30 AM    MCHC 28.0 10/13/2022 03:30 AM    RDW 18.4 10/13/2022 03:30 AM    PLT 79 10/13/2022 03:30 AM    MPV 10.0 10/13/2022 03:30 AM     CBC with Differential:    Lab Results   Component Value Date/Time    WBC 5.6 10/13/2022 03:30 AM    RBC 3.70 10/13/2022 03:30 AM    HGB 7.2 10/13/2022 03:30 AM    HCT 25.7 10/13/2022 03:30 AM    PLT 79 10/13/2022 03:30 AM    MCV 69.5 10/13/2022 03:30 AM    MCH 19.5 10/13/2022 03:30 AM    MCHC 28.0 10/13/2022 03:30 AM    RDW 18.4 10/13/2022 03:30 AM    LYMPHOPCT 18.2 10/13/2022 03:30 AM    MONOPCT 8.8 10/13/2022 03:30 AM    BASOPCT 0.4 10/13/2022 03:30 AM    MONOSABS 0.49 10/13/2022 03:30 AM    LYMPHSABS 1.01 10/13/2022 03:30 AM    EOSABS 0.04 10/13/2022 03:30 AM    BASOSABS 0.02 10/13/2022 03:30 AM     Hemoglobin/Hematocrit:    Lab Results   Component Value Date/Time    HGB 7.2 10/13/2022 03:30 AM    HCT 25.7 10/13/2022 03:30 AM     CMP:    Lab Results   Component Value Date/Time     10/13/2022 03:30 AM    K 3.9 10/13/2022 03:30 AM    K 4.2 10/10/2022 04:16 PM    CL 99 10/13/2022 03:30 AM    CO2 25 10/13/2022 03:30 AM    BUN 26 10/13/2022 03:30 AM    CREATININE 0.6 10/13/2022 03:30 AM    GFRAA >60 10/13/2022 03:30 AM    LABGLOM >60 10/13/2022 03:30 AM    GLUCOSE 186 10/13/2022 03:30 AM    PROT 6.6 10/13/2022 03:30 AM    LABALBU 2.7 10/13/2022 03:30 AM    CALCIUM 8.3 10/13/2022 03:30 AM    BILITOT 0.8 10/13/2022 03:30 AM    ALKPHOS 83 10/13/2022 03:30 AM    AST 64 10/13/2022 03:30 AM    ALT 25 10/13/2022 03:30 AM     BMP:    Lab Results   Component Value Date/Time     10/13/2022 03:30 AM    K 3.9 10/13/2022 03:30 AM    K 4.2 10/10/2022 04:16 PM    CL 99 10/13/2022 03:30 AM    CO2 25 10/13/2022 03:30 AM    BUN 26 10/13/2022 03:30 AM    LABALBU 2.7 10/13/2022 03:30 AM    CREATININE 0.6 10/13/2022 03:30 AM    CALCIUM 8.3 10/13/2022 03:30 AM    GFRAA >60 10/13/2022 03:30 AM    LABGLOM >60 10/13/2022 03:30 AM    GLUCOSE 186 10/13/2022 03:30 AM     Sodium:    Lab Results   Component Value Date/Time     10/13/2022 03:30 AM     Hepatic Function Panel:    Lab Results   Component Value Date/Time    ALKPHOS 83 10/13/2022 03:30 AM    ALT 25 10/13/2022 03:30 AM    AST 64 10/13/2022 03:30 AM    PROT 6.6 10/13/2022 03:30 AM    BILITOT 0.8 10/13/2022 03:30 AM    LABALBU 2.7 10/13/2022 03:30 AM     Albumin:    Lab Results   Component Value Date/Time    LABALBU 2.7 10/13/2022 03:30 AM     Calcium:    Lab Results   Component Value Date/Time    CALCIUM 8.3 10/13/2022 03:30 AM     Ionized Calcium:  No results found for: IONCA  Magnesium:  No results found for: MG  Phosphorus:  No results found for: PHOS  LDH:  No results found for: LDH  Uric Acid:  No results found for: LABURIC, URICACID  PT/INR:    Lab Results   Component Value Date/Time    PROTIME 19.0 10/13/2022 03:30 AM    INR 1.7 10/13/2022 03:30 AM     PTT:  No results found for: APTT, PTT[APTT}  Troponin:  No results found for: TROPONINI  U/A:    Lab Results   Component Value Date/Time    COLORU DARK YELLOW 10/10/2022 07:09 PM    PROTEINU 30 10/10/2022 07:09 PM    PHUR 5.5 10/10/2022 07:09 PM    WBCUA 0-1 10/10/2022 07:09 PM    RBCUA 0-1 10/10/2022 07:09 PM    BACTERIA FEW 10/10/2022 07:09 PM    CLARITYU Clear 10/10/2022 07:09 PM SPECGRAV >=1.030 10/10/2022 07:09 PM    LEUKOCYTESUR Negative 10/10/2022 07:09 PM    UROBILINOGEN 1.0 10/10/2022 07:09 PM    BILIRUBINUR MODERATE 10/10/2022 07:09 PM    BLOODU Negative 10/10/2022 07:09 PM    GLUCOSEU 250 10/10/2022 07:09 PM     ABG:  No results found for: PH, PCO2, PO2, HCO3, BE, THGB, TCO2, O2SAT  HgBA1c:  No results found for: LABA1C  Microalbumen/Creatinine ratio:  No components found for: RUCREAT  FLP:    Lab Results   Component Value Date/Time    HDL 11 10/12/2022 02:15 AM    LDLCALC 41 10/12/2022 02:15 AM    LABVLDL 23 10/12/2022 02:15 AM     TSH:    Lab Results   Component Value Date/Time    TSH 0.878 10/11/2022 11:10 AM     VITAMIN B12: No components found for: B12  FOLATE:    Lab Results   Component Value Date/Time    FOLATE 10.4 10/12/2022 02:15 AM     Iron Saturation:  No components found for: PERCENTFE  FERRITIN:    Lab Results   Component Value Date/Time    FERRITIN 25 10/11/2022 11:10 AM     LIPASE:    Lab Results   Component Value Date/Time    LIPASE 117 10/13/2022 03:30 AM     Fibrinogen Level:  No components found for: FIB  24 Hour Urine for Protein:  No components found for: RAWUPRO, UHRS3, PLTJ50XX, UTV3  24 Hour Urine for Creatinine Clearance:  No components found for: CREAT4, UHRS10, UTV10     Imaging:  CT ABDOMEN PELVIS WO CONTRAST Additional Contrast? None [[id number]] Collected: 10/10/22 2126     Order Status: Completed Updated: 10/10/22 2136    Narrative:      EXAMINATION:   CT OF THE ABDOMEN AND PELVIS WITHOUT CONTRAST 10/10/2022 8:05 pm     TECHNIQUE:   CT of the abdomen and pelvis was performed without the administration of   intravenous contrast. Multiplanar reformatted images are provided for review. Automated exposure control, iterative reconstruction, and/or weight based   adjustment of the mA/kV was utilized to reduce the radiation dose to as low   as reasonably achievable.      COMPARISON:   CT of the pelvis, 10/14/2014     HISTORY:   ORDERING SYSTEM PROVIDED HISTORY: elevated bilirubin, ruq tender   TECHNOLOGIST PROVIDED HISTORY:   Reason for exam:->elevated bilirubin, ruq tender   Additional Contrast?->None   Decision Support Exception - unselect if not a suspected or confirmed   emergency medical condition->Emergency Medical Condition (MA)     FINDINGS:   Lower Chest: The lung bases are clear. The heart is normal in size. No   pleural or pericardial effusion. Small hiatal hernia. Relative hypodensity   of the blood in the cardiac chambers consistent with patient's history of   anemia. Organs:     Liver: Irregular liver contour indicating cirrhosis. No focal lesion or   ductal dilatation appreciated. Gallbladder: Multiple small calcified gallstones are seen. No evidence of   pericholecystic edema. Pancreas: Unremarkable. Spleen: The spleen is mildly enlarged, measuring 15.3 cm in length. Adrenals: Unremarkable. Kidneys: Normal in size and contour. Subtly increased renal medullary   density. GI/Bowel: Liquid stool is seen in the proximal colon. No bowel wall   thickening or obstruction. Pelvis: The urinary bladder is unremarkable. Within limits of the CT   technique, the uterus and the adnexa are grossly unremarkable. Peritoneum/Retroperitoneum: Prominent calcified atherosclerosis is seen in   the aorta. No aneurysm. No lymphadenopathy. No free air. Small volume   perihepatic ascites. Bones/Soft Tissues: The visualized bones are intact without fracture or focal   lesion. Prominent loss of disc height at L5-S1. Impression:      1. Liver cirrhosis with mild splenomegaly. Minimal ascites. 2. Liquid stool in the proximal colon indicating diarrheal illness. 3.  Subtly increased renal medullary density, which may be seen from any   cause resulting in increased urine osmolality , most commonly dehydration.    However, the differential diagnosis for the \"ense renal medulla sign\" is   wide, and includes: Hyponatremia, high-protein diet, glucosuria, adrenal   insufficiency SIADH, hyperparathyroidism medullary sponge kidneys etc.   Early/mild presentation of medullary nephrocalcinosis may present similarly. Clinical correlation is needed. 4. Small hiatal hernia. 5. Cholelithiasis. XR CHEST (2 VW) [[id number]] Collected: 10/10/22 1818    Order Status: Completed Specimen: Chest Updated: 10/10/22 1821    Narrative:      EXAMINATION:   TWO XRAY VIEWS OF THE CHEST     10/10/2022 4:23 pm     COMPARISON:   None. HISTORY:   ORDERING SYSTEM PROVIDED HISTORY: LE edema   TECHNOLOGIST PROVIDED HISTORY:   Reason for exam:->LE edema     FINDINGS:   The lungs are without acute focal process. There is no effusion or   pneumothorax. The cardiomediastinal silhouette is without acute process. The   osseous structures are without acute process. Impression:      No acute process. Assessment  1-Hyponatremia with a urine Na+<20 suggests a component of decreased effective renal perfusion. Component sec to Chronic Liver Disease. Pt treated with furosemide and NaCl overnight 10/10 now Na 131  PLAN:  1. Follow Na+  2. Continue NaCl 2 gram tid    2-Anemia and Thrombocytopenia in the setting of the Cirrhosis  Hgb 8.4-->7.7->7.0->7.2  PLT 89-->81->75->79K  B12, folate- WNL SPEP no monoclonal protein  Fe++ sat 4%  Plan:  Dose IV iron  GI planning EGD and C-scope 10/14      3-Proteinuria  possible UTI, possible reflects the acute inflammatory status or the cirrhosis  Microalb: Cr 42.4  Plan:  Follow     4- Primary HTN  BP goal <120/80-BP at goal  PLAN:  1. Follow BP    5. Thigh lesion-  Ortho has seen and plans for IR exam of same      Thank you for allowing us to participate in care of EDWAR Nunez CNP  10:57 AM  10/13/2022   Patient seen and examined. I had a face to face encounter with the patient.  She is trying to drink the Prep for C-Scope  Agree with exam.    Agree with  formulation, assessment and plan as outlined above and directed by me. Addition and corrections were done as deemed appropriate.    My exam and plan include:     A/P:  Hyponatremia-Na+ up to 131-follow on the NaCl tablets    Continue current treatment as outlined above    AMINA Narvaez MD

## 2022-10-13 NOTE — ANESTHESIA PRE PROCEDURE
Department of Anesthesiology  Preprocedure Note       Name:  Arash Alvarado   Age:  79 y.o.  :  1955                                          MRN:  [id number]         Date:  10/13/2022      Surgeon: Hallie Olivares):  Juan Pablo Vela MD    Procedure: Procedure(s):  EGD ESOPHAGOGASTRODUODENOSCOPY POSSIBLE BANDING  COLONOSCOPY DIAGNOSTIC    Medications prior to admission:   Prior to Admission medications    Medication Sig Start Date End Date Taking? Authorizing Provider   meloxicam (MOBIC) 7.5 MG tablet Take 7.5 mg by mouth daily   Yes Historical Provider, MD   chlorzoxazone (PARAFON FORTE) 500 MG tablet Take 500 mg by mouth 3 times daily   Yes Historical Provider, MD   lisinopril (PRINIVIL;ZESTRIL) 10 MG tablet Take 10 mg by mouth daily. Historical Provider, MD   ibuprofen (ADVIL;MOTRIN) 800 MG tablet Take 1 tablet by mouth every 8 hours as needed for Pain. 10/14/14   Jose Castanon MD       Current medications:    Current Facility-Administered Medications   Medication Dose Route Frequency Provider Last Rate Last Admin    [START ON 10/14/2022] pantoprazole (PROTONIX) tablet 40 mg  40 mg Oral QAM AC Teri Montilla APRN - CNP        ceFAZolin (ANCEF) 1,000 mg in sterile water 10 mL IV syringe  1,000 mg IntraVENous Q8H Manuel Cabezas MD        HYDROcodone-acetaminophen [facility]) 5-325 MG per tablet 1 tablet  1 tablet Oral Q4H PRN Rosangela Ortega DO   1 tablet at 10/13/22 1626    sodium chloride tablet 2 g  2 g Oral TID ALFREDO Mercedes MD   2 g at 10/13/22 1629    insulin lispro (HUMALOG) injection vial 0-4 Units  0-4 Units SubCUTAneous TID  Rosangela Ortega DO   1 Units at 10/13/22 1627    insulin lispro (HUMALOG) injection vial 0-4 Units  0-4 Units SubCUTAneous Nightly Rosangela Ortega DO           Allergies:  No Known Allergies    Problem List:    Patient Active Problem List   Diagnosis Code    Fall from other slipping, tripping, or stumbling W01. 0XXA    Groin strain S76.219A    Contusion T14. Clyde Campos Hyponatremia E87.1       Past Medical History:        Diagnosis Date    Hypertension        Past Surgical History:  No past surgical history on file. Social History:    Social History     Tobacco Use    Smoking status: Former     Types: Cigarettes     Quit date: 3/21/2007     Years since quitting: 15.5    Smokeless tobacco: Never   Substance Use Topics    Alcohol use: No                                Counseling given: Not Answered      Vital Signs (Current):   Vitals:    10/13/22 0529 10/13/22 0624 10/13/22 1357 10/13/22 1510   BP:  118/68 117/65 (!) 150/65   Pulse:  60 65 71   Resp:  16 18 18   Temp:  97.7 °F (36.5 °C) 98.1 °F (36.7 °C)    TempSrc:  Oral Oral    SpO2:  97% 100% 98%   Weight: 140 lb (63.5 kg)      Height:                                                  BP Readings from Last 3 Encounters:   10/13/22 (!) 150/65   03/21/17 (!) 170/94       NPO Status:                                                                                 BMI:   Wt Readings from Last 3 Encounters:   10/13/22 140 lb (63.5 kg)   03/21/17 140 lb (63.5 kg)     Body mass index is 22.6 kg/m².     CBC:   Lab Results   Component Value Date/Time    WBC 5.6 10/13/2022 03:30 AM    RBC 3.70 10/13/2022 03:30 AM    HGB 7.2 10/13/2022 03:30 AM    HCT 25.7 10/13/2022 03:30 AM    MCV 69.5 10/13/2022 03:30 AM    RDW 18.4 10/13/2022 03:30 AM    PLT 79 10/13/2022 03:30 AM       CMP:   Lab Results   Component Value Date/Time     10/13/2022 03:30 AM    K 3.9 10/13/2022 03:30 AM    K 4.2 10/10/2022 04:16 PM    CL 99 10/13/2022 03:30 AM    CO2 25 10/13/2022 03:30 AM    BUN 26 10/13/2022 03:30 AM    CREATININE 0.6 10/13/2022 03:30 AM    GFRAA >60 10/13/2022 03:30 AM    LABGLOM >60 10/13/2022 03:30 AM    GLUCOSE 186 10/13/2022 03:30 AM    PROT 6.6 10/13/2022 03:30 AM    CALCIUM 8.3 10/13/2022 03:30 AM    BILITOT 0.8 10/13/2022 03:30 AM    ALKPHOS 83 10/13/2022 03:30 AM    AST 64 10/13/2022 03:30 AM    ALT 25 10/13/2022 03:30 AM       POC Tests: No results for input(s): POCGLU, POCNA, POCK, POCCL, POCBUN, POCHEMO, POCHCT in the last 72 hours. Coags:   Lab Results   Component Value Date/Time    PROTIME 19.0 10/13/2022 03:30 AM    INR 1.7 10/13/2022 03:30 AM       HCG (If Applicable): No results found for: PREGTESTUR, PREGSERUM, HCG, HCGQUANT     ABGs: No results found for: PHART, PO2ART, FUN9DUX, ILK1COC, BEART, E5GLHKLK     Type & Screen (If Applicable):  No results found for: LABABO, LABRH    Drug/Infectious Status (If Applicable):  No results found for: HIV, HEPCAB    COVID-19 Screening (If Applicable): No results found for: COVID19      CT:  Impression   1. Liver cirrhosis with mild splenomegaly.  Minimal ascites. 2. Liquid stool in the proximal colon indicating diarrheal illness. 3.  Subtly increased renal medullary density, which may be seen from any   cause resulting in increased urine osmolality , most commonly dehydration. However, the differential diagnosis for the \"ense renal medulla sign\" is   wide, and includes: Hyponatremia, high-protein diet, glucosuria, adrenal   insufficiency SIADH, hyperparathyroidism medullary sponge kidneys etc.   Early/mild presentation of medullary nephrocalcinosis may present similarly. Clinical correlation is needed. 4. Small hiatal hernia.    5. Cholelithiasis.              Anesthesia Evaluation  Patient summary reviewed and Nursing notes reviewed no history of anesthetic complications:   Airway:           Dental:          Pulmonary:       (-) not a current smoker (Former - quit in 2007)                           Cardiovascular:  Exercise tolerance: good (>4 METS),   (+) hypertension: moderate and no interval change,       ECG reviewed               Beta Blocker:  Not on Beta Blocker      ROS comment: EKG:  Normal sinus rhythm  Delayed precordial transition  Abnormal ECG  No previous ECGs available     Neuro/Psych:   Negative Neuro/Psych ROS              GI/Hepatic/Renal:   (+) hepatitis: C, liver disease (INR 1.7): coagulopathy from hepatic dysfunction,          ROS comment: Per surgery note:  79 y.o. female with new onset cirrohsis with peripheral edema and ascites, elevated CEA and FOBT + stools. Concern for colonic malignancy vs. GIB, cirrohsis 2/2 hepatitis c infection . Endo/Other:    (+) blood dyscrasia (7.2/25.7 with platelets 56L ): anemia and thrombocytopenia, arthritis: OA., electrolyte abnormalities (Hyponatremia (Na+ 131 mmol/L)), . Pt had no PAT visit       Abdominal:             Vascular: negative vascular ROS. Other Findings:           Anesthesia Plan      MAC     ASA 4       Induction: intravenous. MIPS: Postoperative opioids intended and Prophylactic antiemetics administered. Anesthetic plan and risks discussed with patient. Plan discussed with CRNA. 818 Jlpreeti Coronado, DO   10/13/2022    History, data, and pertinent studies from chart review. Above represents information available via the shared medical record including previous anesthetic, medication, and allergy history. Confirmation of above and final disposition per DOS anesthesiologist.    [address] [place] [address] Ivonne, DO  Staff Anesthesiologist  October 13, 2022  7:18 PM  NPO>MN  Chart reviewed . Patient assessed before induction. I agree with the above note.   EDWAR Strickland - INGE

## 2022-10-13 NOTE — PROGRESS NOTES
PROGRESS NOTE        Patient Presents with/Seen in Consultation For      *Reason for Consult: cirrhosis   CHIEF COMPLAINT:  lower leg edema     Subjective:     Patient seen laying in bed in no apparent distress. Discussed need for EGD and colonoscopy, patient agrees. No complaints of abdominal pain, nausea or vomiting. Had BM yesterday denies melena or hematochezia. Discussed hepatitis panel with patient, further labs ordered. Plan of care discussed with patient, verbalizing understanding and agreement. Review of Systems  Aside from what was mentioned in the PMH and HPI, essentially unremarkable, all others negative. Objective:     /68   Pulse 60   Temp 97.7 °F (36.5 °C) (Oral)   Resp 16   Ht 5' 6\" (1.676 m)   Wt 140 lb (63.5 kg)   SpO2 97%   BMI 22.60 kg/m²     General appearance: alert, awake, laying in bed, and cooperative  Eyes: conjunctiva pale, sclera anicteric. PERRL.   Lungs: clear to auscultation bilaterally  Heart: regular rate and rhythm, no murmur, 2+ pulses; no edema  Abdomen: soft, non-tender; bowel sounds normal; no masses,  no organomegaly  Extremities: extremities without edema  Pulses: 2+ and symmetric  Skin: Skin color, texture, turgor normal.   Neurologic: Grossly normal    bisacodyl (DULCOLAX) EC tablet 30 mg, Once  polyethylene glycol-electrolytes (COLYTE) solution 2,000 mL, Once  [START ON 10/14/2022] pantoprazole (PROTONIX) tablet 40 mg, QAM AC  HYDROcodone-acetaminophen (NORCO) 5-325 MG per tablet 1 tablet, Q4H PRN  ferric gluconate (FERRLECIT) 125 mg in sodium chloride 0.9 % 100 mL IVPB, Once  sodium chloride tablet 2 g, TID WC  insulin lispro (HUMALOG) injection vial 0-4 Units, TID WC  insulin lispro (HUMALOG) injection vial 0-4 Units, Nightly       Data Review  CBC:   Lab Results   Component Value Date/Time    WBC 5.6 10/13/2022 03:30 AM    RBC 3.70 10/13/2022 03:30 AM    HGB 7.2 10/13/2022 03:30 AM    HCT 25.7 10/13/2022 03:30 AM    MCV 69.5 10/13/2022 03:30 AM MCH 19.5 10/13/2022 03:30 AM    MCHC 28.0 10/13/2022 03:30 AM    RDW 18.4 10/13/2022 03:30 AM    PLT 79 10/13/2022 03:30 AM    MPV 10.0 10/13/2022 03:30 AM     CMP:    Lab Results   Component Value Date/Time     10/13/2022 03:30 AM    K 3.9 10/13/2022 03:30 AM    K 4.2 10/10/2022 04:16 PM    CL 99 10/13/2022 03:30 AM    CO2 25 10/13/2022 03:30 AM    BUN 26 10/13/2022 03:30 AM    CREATININE 0.6 10/13/2022 03:30 AM    GFRAA >60 10/13/2022 03:30 AM    LABGLOM >60 10/13/2022 03:30 AM    GLUCOSE 186 10/13/2022 03:30 AM    PROT 6.6 10/13/2022 03:30 AM    LABALBU 2.7 10/13/2022 03:30 AM    CALCIUM 8.3 10/13/2022 03:30 AM    BILITOT 0.8 10/13/2022 03:30 AM    ALKPHOS 83 10/13/2022 03:30 AM    AST 64 10/13/2022 03:30 AM    ALT 25 10/13/2022 03:30 AM     Hepatic Function Panel:    Lab Results   Component Value Date/Time    ALKPHOS 83 10/13/2022 03:30 AM    ALT 25 10/13/2022 03:30 AM    AST 64 10/13/2022 03:30 AM    PROT 6.6 10/13/2022 03:30 AM    BILITOT 0.8 10/13/2022 03:30 AM    LABALBU 2.7 10/13/2022 03:30 AM     No components found for: CHLPL  No results found for: TRIG    Lab Results   Component Value Date    HDL 11 10/12/2022       Lab Results   Component Value Date    LDLCALC 41 10/12/2022       Lab Results   Component Value Date    LABVLDL 23 10/12/2022      PT/INR:    Lab Results   Component Value Date/Time    PROTIME 19.0 10/13/2022 03:30 AM    INR 1.7 10/13/2022 03:30 AM     IRON:    Lab Results   Component Value Date/Time    IRON 13 10/11/2022 11:10 AM     Iron Saturation:  No components found for: PERCENTFE  FERRITIN:    Lab Results   Component Value Date/Time    FERRITIN 25 10/11/2022 11:10 AM         Assessment:     Active Problems:  Liver Cirrhosis  Ascites, minimal on CT  Bilateral lower leg edema   Cholelithiasis   Hyponatremia   Elevated lipase   Anemia, microcytic   Thrombocytopenia   Elevated CEA 10.8    Plan:   Liver serology noted  Iron studies noted   Hepatitis panel, hep c reactive, further labs ordered and pending  Clear diet today  Protonix 40 mg daily  NPO after midnight  EGD and colonoscopy tomorrow 10/14/22. Procedure details for EGD/colonoscopy discussed in detail. Complications including but not limited to, perforation, bleeding and infection were discussed in great detail. Risks, benefits, and alternatives explained. Pt has understood the information and has agreed to proceed. See orders  Prep ordered, mag citrate unavailable, GoLytely ordered  Hold anticoagulants   Monitor CBC, CMP, INR, ammonia and lipase daily   Supportive care  Continue to monitor       Note: This report was completed utilizing computer voice recognition software. Every effort has been made to ensure accuracy, however; inadvertent computerized transcription errors may be present.      Discussed with Dr. Delfino Ledezma per Dr. Angel VANN-NP-C 10/13/2022  10:36 AM For Dr. Ariella Williamson

## 2022-10-13 NOTE — OR NURSING
Patient arrived to IR for biopsy of right thigh mass with possible drain insertion. Procedure reviewed by Dr. Tex Stahl and consent signed. Vitals monitored. Using ultrasound, Dr. Tex Stahl injected 1% Lidocaine into right thigh mass. Puss removed from mass. 8 [place] drain inserted, 10cc bloody purulent drainage aspirated and drainage bag attached. Fluid sent for analysis. Patient tolerated procedure well.

## 2022-10-14 ENCOUNTER — ANESTHESIA (OUTPATIENT)
Dept: ENDOSCOPY | Age: 67
DRG: 641 | End: 2022-10-14
Payer: MEDICARE

## 2022-10-14 LAB
ALBUMIN SERPL-MCNC: 2.9 G/DL (ref 3.5–5.2)
ALP BLD-CCNC: 82 U/L (ref 35–104)
ALT SERPL-CCNC: 27 U/L (ref 0–32)
AMMONIA: 15.5 UMOL/L (ref 11–51)
ANION GAP SERPL CALCULATED.3IONS-SCNC: 11 MMOL/L (ref 7–16)
ANISOCYTOSIS: ABNORMAL
APPEARANCE FLUID: ABNORMAL
AST SERPL-CCNC: 56 U/L (ref 0–31)
ATYPICAL LYMPHOCYTE RELATIVE PERCENT: 0.9 % (ref 0–4)
BASO FLUID: 0 %
BASOPHILS ABSOLUTE: 0 E9/L (ref 0–0.2)
BASOPHILS RELATIVE PERCENT: 0 % (ref 0–2)
BILIRUB SERPL-MCNC: 0.7 MG/DL (ref 0–1.2)
BUN BLDV-MCNC: 17 MG/DL (ref 6–23)
CALCIUM SERPL-MCNC: 8.7 MG/DL (ref 8.6–10.2)
CELL COUNT FLUID TYPE: ABNORMAL
CHLORIDE BLD-SCNC: 98 MMOL/L (ref 98–107)
CO2: 25 MMOL/L (ref 22–29)
COLOR FLUID: ABNORMAL
CREAT SERPL-MCNC: 0.6 MG/DL (ref 0.5–1)
EOSINOPHIL FLUID: 0 %
EOSINOPHILS ABSOLUTE: 0.08 E9/L (ref 0.05–0.5)
EOSINOPHILS RELATIVE PERCENT: 1.7 % (ref 0–6)
GFR AFRICAN AMERICAN: >60
GFR NON-AFRICAN AMERICAN: >60 ML/MIN/1.73
GLUCOSE BLD-MCNC: 206 MG/DL (ref 74–99)
GRAM STAIN ORDERABLE: NORMAL
HCT VFR BLD CALC: 25.7 % (ref 34–48)
HEMOGLOBIN: 7 G/DL (ref 11.5–15.5)
HYPOCHROMIA: ABNORMAL
INR BLD: 1.7
LIPASE: 76 U/L (ref 13–60)
LYMPHOCYTES ABSOLUTE: 0.63 E9/L (ref 1.5–4)
LYMPHOCYTES RELATIVE PERCENT: 13 % (ref 20–42)
LYMPHOCYTES, BODY FLUID: 6 %
MCH RBC QN AUTO: 19.3 PG (ref 26–35)
MCHC RBC AUTO-ENTMCNC: 27.2 % (ref 32–34.5)
MCV RBC AUTO: 71 FL (ref 80–99.9)
METER GLUCOSE: 180 MG/DL (ref 74–99)
METER GLUCOSE: 188 MG/DL (ref 74–99)
METER GLUCOSE: 219 MG/DL (ref 74–99)
METER GLUCOSE: 257 MG/DL (ref 74–99)
MONOCYTE, FLUID: 2 %
MONOCYTES ABSOLUTE: 0 E9/L (ref 0.1–0.95)
MONOCYTES RELATIVE PERCENT: 0 % (ref 2–12)
NEUTROPHIL, FLUID: 92 %
NEUTROPHILS ABSOLUTE: 3.78 E9/L (ref 1.8–7.3)
NEUTROPHILS RELATIVE PERCENT: 83.5 % (ref 43–80)
NUCLEATED CELLS FLUID: ABNORMAL /UL
NUCLEATED RED BLOOD CELLS: 0 /100 WBC
PDW BLD-RTO: 18.3 FL (ref 11.5–15)
PLASMA CELLS PERCENT: 0.9 % (ref 0–0)
PLATELET # BLD: 91 E9/L (ref 130–450)
PLATELET CONFIRMATION: NORMAL
PMV BLD AUTO: 10.7 FL (ref 7–12)
POLYCHROMASIA: ABNORMAL
POTASSIUM SERPL-SCNC: 3.5 MMOL/L (ref 3.5–5)
PROTHROMBIN TIME: 19.2 SEC (ref 9.3–12.4)
RBC # BLD: 3.62 E12/L (ref 3.5–5.5)
RBC FLUID: ABNORMAL /UL
SODIUM BLD-SCNC: 134 MMOL/L (ref 132–146)
TARGET CELLS: ABNORMAL
TOTAL PROTEIN: 6.8 G/DL (ref 6.4–8.3)
WBC # BLD: 4.5 E9/L (ref 4.5–11.5)

## 2022-10-14 PROCEDURE — 2709999900 HC NON-CHARGEABLE SUPPLY: Performed by: INTERNAL MEDICINE

## 2022-10-14 PROCEDURE — 6360000002 HC RX W HCPCS: Performed by: SPECIALIST

## 2022-10-14 PROCEDURE — 7100000011 HC PHASE II RECOVERY - ADDTL 15 MIN: Performed by: INTERNAL MEDICINE

## 2022-10-14 PROCEDURE — 7100000010 HC PHASE II RECOVERY - FIRST 15 MIN: Performed by: INTERNAL MEDICINE

## 2022-10-14 PROCEDURE — 83690 ASSAY OF LIPASE: CPT

## 2022-10-14 PROCEDURE — 3700000001 HC ADD 15 MINUTES (ANESTHESIA): Performed by: INTERNAL MEDICINE

## 2022-10-14 PROCEDURE — 85025 COMPLETE CBC W/AUTO DIFF WBC: CPT

## 2022-10-14 PROCEDURE — 2060000000 HC ICU INTERMEDIATE R&B

## 2022-10-14 PROCEDURE — 0DJD8ZZ INSPECTION OF LOWER INTESTINAL TRACT, VIA NATURAL OR ARTIFICIAL OPENING ENDOSCOPIC: ICD-10-PCS | Performed by: INTERNAL MEDICINE

## 2022-10-14 PROCEDURE — 6370000000 HC RX 637 (ALT 250 FOR IP): Performed by: INTERNAL MEDICINE

## 2022-10-14 PROCEDURE — 88305 TISSUE EXAM BY PATHOLOGIST: CPT

## 2022-10-14 PROCEDURE — 36415 COLL VENOUS BLD VENIPUNCTURE: CPT

## 2022-10-14 PROCEDURE — 82140 ASSAY OF AMMONIA: CPT

## 2022-10-14 PROCEDURE — 80053 COMPREHEN METABOLIC PANEL: CPT

## 2022-10-14 PROCEDURE — 6370000000 HC RX 637 (ALT 250 FOR IP): Performed by: GENERAL PRACTICE

## 2022-10-14 PROCEDURE — 6360000002 HC RX W HCPCS: Performed by: NURSE ANESTHETIST, CERTIFIED REGISTERED

## 2022-10-14 PROCEDURE — 0DB68ZX EXCISION OF STOMACH, VIA NATURAL OR ARTIFICIAL OPENING ENDOSCOPIC, DIAGNOSTIC: ICD-10-PCS | Performed by: INTERNAL MEDICINE

## 2022-10-14 PROCEDURE — 2580000003 HC RX 258: Performed by: NURSE ANESTHETIST, CERTIFIED REGISTERED

## 2022-10-14 PROCEDURE — 87081 CULTURE SCREEN ONLY: CPT

## 2022-10-14 PROCEDURE — 0DB98ZX EXCISION OF DUODENUM, VIA NATURAL OR ARTIFICIAL OPENING ENDOSCOPIC, DIAGNOSTIC: ICD-10-PCS | Performed by: INTERNAL MEDICINE

## 2022-10-14 PROCEDURE — 2580000003 HC RX 258: Performed by: SPECIALIST

## 2022-10-14 PROCEDURE — 82962 GLUCOSE BLOOD TEST: CPT

## 2022-10-14 PROCEDURE — 3700000000 HC ANESTHESIA ATTENDED CARE: Performed by: INTERNAL MEDICINE

## 2022-10-14 PROCEDURE — 6370000000 HC RX 637 (ALT 250 FOR IP): Performed by: SPECIALIST

## 2022-10-14 PROCEDURE — 3609012400 HC EGD TRANSORAL BIOPSY SINGLE/MULTIPLE: Performed by: INTERNAL MEDICINE

## 2022-10-14 PROCEDURE — 3609027000 HC COLONOSCOPY: Performed by: INTERNAL MEDICINE

## 2022-10-14 PROCEDURE — 85610 PROTHROMBIN TIME: CPT

## 2022-10-14 RX ORDER — LINEZOLID 600 MG/1
600 TABLET, FILM COATED ORAL EVERY 12 HOURS SCHEDULED
Status: DISCONTINUED | OUTPATIENT
Start: 2022-10-14 | End: 2022-10-16 | Stop reason: HOSPADM

## 2022-10-14 RX ORDER — SODIUM CHLORIDE 9 MG/ML
INJECTION, SOLUTION INTRAVENOUS CONTINUOUS PRN
Status: DISCONTINUED | OUTPATIENT
Start: 2022-10-14 | End: 2022-10-14 | Stop reason: SDUPTHER

## 2022-10-14 RX ORDER — LANOLIN ALCOHOL/MO/W.PET/CERES
50 CREAM (GRAM) TOPICAL DAILY
Status: DISCONTINUED | OUTPATIENT
Start: 2022-10-14 | End: 2022-10-16 | Stop reason: HOSPADM

## 2022-10-14 RX ORDER — PYRIDOXINE HCL (VITAMIN B6) 50 MG
50 TABLET ORAL DAILY
Qty: 30 TABLET | Refills: 3 | Status: SHIPPED | OUTPATIENT
Start: 2022-10-14 | End: 2022-10-14 | Stop reason: SDUPTHER

## 2022-10-14 RX ORDER — PROPOFOL 10 MG/ML
INJECTION, EMULSION INTRAVENOUS PRN
Status: DISCONTINUED | OUTPATIENT
Start: 2022-10-14 | End: 2022-10-14 | Stop reason: SDUPTHER

## 2022-10-14 RX ORDER — LINEZOLID 600 MG/1
600 TABLET, FILM COATED ORAL EVERY 12 HOURS SCHEDULED
Qty: 28 TABLET | Refills: 0 | Status: SHIPPED | OUTPATIENT
Start: 2022-10-14 | End: 2022-10-28

## 2022-10-14 RX ORDER — PYRIDOXINE HCL (VITAMIN B6) 50 MG
50 TABLET ORAL DAILY
Qty: 30 TABLET | Refills: 3 | Status: SHIPPED | OUTPATIENT
Start: 2022-10-14 | End: 2022-10-16 | Stop reason: SDUPTHER

## 2022-10-14 RX ADMIN — LINEZOLID 600 MG: 600 TABLET, FILM COATED ORAL at 20:27

## 2022-10-14 RX ADMIN — HYDROCODONE BITARTRATE AND ACETAMINOPHEN 1 TABLET: 5; 325 TABLET ORAL at 04:38

## 2022-10-14 RX ADMIN — PYRIDOXINE HCL TAB 50 MG 50 MG: 50 TAB at 15:57

## 2022-10-14 RX ADMIN — HYDROCODONE BITARTRATE AND ACETAMINOPHEN 1 TABLET: 5; 325 TABLET ORAL at 09:21

## 2022-10-14 RX ADMIN — WATER 1000 MG: 1 INJECTION INTRAMUSCULAR; INTRAVENOUS; SUBCUTANEOUS at 05:06

## 2022-10-14 RX ADMIN — PROPOFOL 300 MG: 10 INJECTION, EMULSION INTRAVENOUS at 13:56

## 2022-10-14 RX ADMIN — HYDROCODONE BITARTRATE AND ACETAMINOPHEN 1 TABLET: 5; 325 TABLET ORAL at 22:21

## 2022-10-14 RX ADMIN — Medication 2 G: at 15:57

## 2022-10-14 RX ADMIN — SODIUM CHLORIDE: 9 INJECTION, SOLUTION INTRAVENOUS at 13:33

## 2022-10-14 RX ADMIN — HYDROCODONE BITARTRATE AND ACETAMINOPHEN 1 TABLET: 5; 325 TABLET ORAL at 16:02

## 2022-10-14 ASSESSMENT — PAIN - FUNCTIONAL ASSESSMENT
PAIN_FUNCTIONAL_ASSESSMENT: ACTIVITIES ARE NOT PREVENTED

## 2022-10-14 ASSESSMENT — PAIN SCALES - GENERAL
PAINLEVEL_OUTOF10: 6
PAINLEVEL_OUTOF10: 6
PAINLEVEL_OUTOF10: 0
PAINLEVEL_OUTOF10: 5

## 2022-10-14 NOTE — PROGRESS NOTES
Administered 250cc of tap water enema. Output clear with very small amount of brown streaks. Patient does not want any more of the enema.

## 2022-10-14 NOTE — CARE COORDINATION
CASE MANAGEMENT. .. S/p IR bx of right thigh mass with drain insertion. Patient scheduled for EGD/C scope today. ID following. On iv ancef q8hrs, po protonix and NaCl tabs. Met with Mrs Bridget Bauer at the bedside. Confirmed dc plan is home. Offered HHC if needed for drain care or any other needs. She declines at this time. Will cont to follow and assist accordingly.

## 2022-10-14 NOTE — PROGRESS NOTES
Pt for EGD/Colon today with Dr. Kayla Morrison. All additional questions answered. Tolerated prep. States her stools have been light in color, liquid. Awaiting Am labs, assessment unchanged. Pt stable for procedure.    Naomi Perez, APRN - CNP NP-C

## 2022-10-14 NOTE — PROGRESS NOTES
GENERAL SURGERY  PROGRESS NOTE  10/12/2022     Physician Consulted: Dr. Fredo Pabon   Reason for Consult: Elevated CEA, Acute blood loss anemia with FOBT+ stool     HPI  Patient denies complaints today. Tolerating diet. Tender over right anterior thigh, drain in place with seropurulent output. Improved from yesterday. No BM or bleeding overnight. Afebrile, hemodynamically stable    Am labs pending      PHYSICAL EXAM:      Vitals:    10/13/22 1945   BP: 123/68   Pulse: 67   Resp: 18   Temp: 97.6 °F (36.4 °C)   SpO2: 99%          GENERAL:  NAD. A&Ox3. HEAD:  Normocephalic. Atraumatic. EYES:   No scleral icterus. PERRL. LUNGS:  No increased work of breathing. CARDIOVASCULAR: RR  ABDOMEN:  Soft, non-tender. No guarding, rigidity, rebound. Mildly distended. EXTREMITIES:   MAEx4. Atraumatic. No LE edema. Right anterior thigh palpable mass that is tender to palpation without overlying sking changes. SKIN:  Warm and dry  NEUROLOGIC:  GCS   RECTAL: No hemorrhoids. No pita blood, FOBT +        ASSESSMENT/PLAN:  79 y.o. female with new onset cirrohsis with peripheral edema and ascites, elevated CEA and FOBT + stools. Concern for colonic malignancy vs. GIB, cirrohsis 2/2 hepatitis c infection      CEA 10.8  GI following- liver serology pending, hepatitis panel pending, lasix and aldactone -- Hep C positive.    Monitor lipase- up to 117 today from 47  Follow for remainder of tumor markers which are currently pending  Colonoscopy and EGD is planned with GI on 10/14 with GI   Ortho consulted for thigh mass -- s/p IR drain 110/13  No acute surgical intervention currently planned       Electronically signed by Kalli Cisneros MD on 10/14/2022 at 5:19 AM

## 2022-10-14 NOTE — CARE COORDINATION
CASE MANAGEMENT. .. Received script for zyvox. Patient uses [facility]. Tessa Hall called in to Pharmacist Kortney Reid and the cost to patient would be $112.87. Mrs Annie Cheng updated and Good Rx coupons for [facility] $56.96 and [facility] $53.86 provided should she choose to go elsewhere. Zyovox script placed in chart.

## 2022-10-14 NOTE — PROGRESS NOTES
Nephrology Progress Note  Patient's Name: Candace Mir  10:21 AM  10/14/2022    Nephrologist: Trudi García    Reason for Consult:  Hyponatremia    History of Present Ilness:  from consult 10/11/22-  Candace Mir is a 79 y.o. female with prior history of HTN on lisinopril as an outpt. Pt presented to the ED with the recent Hx of LE edema and fatigue. She denied CP or SOB. She has no N/V/D. She uses no NSAID. She denies lightheadedness or dizziness. She has no urinary tract symptoms. CT Scan showed cirrhosis with ascites as well as a renal medullary density, Na+ 122, HgB 8.4, PLT 89    10/14/22- awaiting EGD/C-scope this afternoon. Past Medical History:   Diagnosis Date    Hypertension        No past surgical history on file. No family history on file. reports that she quit smoking about 15 years ago. Her smoking use included cigarettes. She has never used smokeless tobacco. She reports that she does not drink alcohol and does not use drugs. Allergies:  Patient has no known allergies. Current Medications:    pantoprazole (PROTONIX) tablet 40 mg, QAM AC  ceFAZolin (ANCEF) 1,000 mg in sterile water 10 mL IV syringe, Q8H  HYDROcodone-acetaminophen (NORCO) 5-325 MG per tablet 1 tablet, Q4H PRN  sodium chloride tablet 2 g, TID WC  insulin lispro (HUMALOG) injection vial 0-4 Units, TID WC  insulin lispro (HUMALOG) injection vial 0-4 Units, Nightly      Review of Systems:   Pertinent items are noted in HPI.     Physical exam:   Constitutional:  Elderly female in NAD  Vitals: VITALS:  /62   Pulse 62   Temp 97.4 °F (36.3 °C) (Oral)   Resp 18   Ht 5' 6\" (1.676 m)   Wt 141 lb 3 oz (64 kg)   SpO2 99%   BMI 22.79 kg/m²   24HR INTAKE/OUTPUT:  No intake or output data in the 24 hours ending 10/14/22 1021    URINARY CATHETER OUTPUT ([place]):     DRAIN/TUBE OUTPUT:     VENT SETTINGS:     Additional Respiratory Assessments  Heart Rate: 62  Resp: 18  SpO2: 99 %  Skin: no rash, turgor wnl  Heent:  eomi, mmm  Neck: no bruits or jvd noted  Cardiovascular:  PMI not lat displaced S1, S2 without S3  Respiratory: Clear and equal bilaterally  Abdomen:  +bs, soft, nt, nd  Ext: + edema lower extremity edema R left trace edema   Psychiatric: mood and affect appropriate    Data:   Labs:  CBC:   Lab Results   Component Value Date/Time    WBC 5.6 10/13/2022 03:30 AM    RBC 3.70 10/13/2022 03:30 AM    HGB 7.2 10/13/2022 03:30 AM    HCT 25.7 10/13/2022 03:30 AM    MCV 69.5 10/13/2022 03:30 AM    MCH 19.5 10/13/2022 03:30 AM    MCHC 28.0 10/13/2022 03:30 AM    RDW 18.4 10/13/2022 03:30 AM    PLT 79 10/13/2022 03:30 AM    MPV 10.0 10/13/2022 03:30 AM     CBC with Differential:    Lab Results   Component Value Date/Time    WBC 5.6 10/13/2022 03:30 AM    RBC 3.70 10/13/2022 03:30 AM    HGB 7.2 10/13/2022 03:30 AM    HCT 25.7 10/13/2022 03:30 AM    PLT 79 10/13/2022 03:30 AM    MCV 69.5 10/13/2022 03:30 AM    MCH 19.5 10/13/2022 03:30 AM    MCHC 28.0 10/13/2022 03:30 AM    RDW 18.4 10/13/2022 03:30 AM    LYMPHOPCT 18.2 10/13/2022 03:30 AM    MONOPCT 8.8 10/13/2022 03:30 AM    BASOPCT 0.4 10/13/2022 03:30 AM    MONOSABS 0.49 10/13/2022 03:30 AM    LYMPHSABS 1.01 10/13/2022 03:30 AM    EOSABS 0.04 10/13/2022 03:30 AM    BASOSABS 0.02 10/13/2022 03:30 AM     Hemoglobin/Hematocrit:    Lab Results   Component Value Date/Time    HGB 7.2 10/13/2022 03:30 AM    HCT 25.7 10/13/2022 03:30 AM     CMP:    Lab Results   Component Value Date/Time     10/13/2022 03:30 AM    K 3.9 10/13/2022 03:30 AM    K 4.2 10/10/2022 04:16 PM    CL 99 10/13/2022 03:30 AM    CO2 25 10/13/2022 03:30 AM    BUN 26 10/13/2022 03:30 AM    CREATININE 0.6 10/13/2022 03:30 AM    GFRAA >60 10/13/2022 03:30 AM    LABGLOM >60 10/13/2022 03:30 AM    GLUCOSE 186 10/13/2022 03:30 AM    PROT 6.6 10/13/2022 03:30 AM    LABALBU 2.7 10/13/2022 03:30 AM    CALCIUM 8.3 10/13/2022 03:30 AM    BILITOT 0.8 10/13/2022 03:30 AM    ALKPHOS 83 10/13/2022 03:30 AM    AST 64 10/13/2022 03:30 AM    ALT 25 10/13/2022 03:30 AM     BMP:    Lab Results   Component Value Date/Time     10/13/2022 03:30 AM    K 3.9 10/13/2022 03:30 AM    K 4.2 10/10/2022 04:16 PM    CL 99 10/13/2022 03:30 AM    CO2 25 10/13/2022 03:30 AM    BUN 26 10/13/2022 03:30 AM    LABALBU 2.7 10/13/2022 03:30 AM    CREATININE 0.6 10/13/2022 03:30 AM    CALCIUM 8.3 10/13/2022 03:30 AM    GFRAA >60 10/13/2022 03:30 AM    LABGLOM >60 10/13/2022 03:30 AM    GLUCOSE 186 10/13/2022 03:30 AM     Sodium:    Lab Results   Component Value Date/Time     10/13/2022 03:30 AM     Hepatic Function Panel:    Lab Results   Component Value Date/Time    ALKPHOS 83 10/13/2022 03:30 AM    ALT 25 10/13/2022 03:30 AM    AST 64 10/13/2022 03:30 AM    PROT 6.6 10/13/2022 03:30 AM    BILITOT 0.8 10/13/2022 03:30 AM    LABALBU 2.7 10/13/2022 03:30 AM     Albumin:    Lab Results   Component Value Date/Time    LABALBU 2.7 10/13/2022 03:30 AM     Calcium:    Lab Results   Component Value Date/Time    CALCIUM 8.3 10/13/2022 03:30 AM     Ionized Calcium:  No results found for: IONCA  Magnesium:  No results found for: MG  Phosphorus:  No results found for: PHOS  LDH:  No results found for: LDH  Uric Acid:  No results found for: LABURIC, URICACID  PT/INR:    Lab Results   Component Value Date/Time    PROTIME 19.0 10/13/2022 03:30 AM    INR 1.7 10/13/2022 03:30 AM     PTT:  No results found for: APTT, PTT[APTT}  Troponin:  No results found for: TROPONINI  U/A:    Lab Results   Component Value Date/Time    COLORU DARK YELLOW 10/10/2022 07:09 PM    PROTEINU 30 10/10/2022 07:09 PM    PHUR 5.5 10/10/2022 07:09 PM    WBCUA 0-1 10/10/2022 07:09 PM    RBCUA 0-1 10/10/2022 07:09 PM    BACTERIA FEW 10/10/2022 07:09 PM    CLARITYU Clear 10/10/2022 07:09 PM    SPECGRAV >=1.030 10/10/2022 07:09 PM    LEUKOCYTESUR Negative 10/10/2022 07:09 PM    UROBILINOGEN 1.0 10/10/2022 07:09 PM    BILIRUBINUR MODERATE 10/10/2022 07:09 PM    BLOODU Negative 10/10/2022 07:09 PM confirmed   emergency medical condition->Emergency Medical Condition (MA)     FINDINGS:   Lower Chest: The lung bases are clear. The heart is normal in size. No   pleural or pericardial effusion. Small hiatal hernia. Relative hypodensity   of the blood in the cardiac chambers consistent with patient's history of   anemia. Organs:     Liver: Irregular liver contour indicating cirrhosis. No focal lesion or   ductal dilatation appreciated. Gallbladder: Multiple small calcified gallstones are seen. No evidence of   pericholecystic edema. Pancreas: Unremarkable. Spleen: The spleen is mildly enlarged, measuring 15.3 cm in length. Adrenals: Unremarkable. Kidneys: Normal in size and contour. Subtly increased renal medullary   density. GI/Bowel: Liquid stool is seen in the proximal colon. No bowel wall   thickening or obstruction. Pelvis: The urinary bladder is unremarkable. Within limits of the CT   technique, the uterus and the adnexa are grossly unremarkable. Peritoneum/Retroperitoneum: Prominent calcified atherosclerosis is seen in   the aorta. No aneurysm. No lymphadenopathy. No free air. Small volume   perihepatic ascites. Bones/Soft Tissues: The visualized bones are intact without fracture or focal   lesion. Prominent loss of disc height at L5-S1. Impression:      1. Liver cirrhosis with mild splenomegaly. Minimal ascites. 2. Liquid stool in the proximal colon indicating diarrheal illness. 3.  Subtly increased renal medullary density, which may be seen from any   cause resulting in increased urine osmolality , most commonly dehydration. However, the differential diagnosis for the \"ense renal medulla sign\" is   wide, and includes: Hyponatremia, high-protein diet, glucosuria, adrenal   insufficiency SIADH, hyperparathyroidism medullary sponge kidneys etc.   Early/mild presentation of medullary nephrocalcinosis may present similarly.    Clinical correlation is needed. 4. Small hiatal hernia. 5. Cholelithiasis. XR CHEST (2 VW) [[id number]] Collected: 10/10/22 1818    Order Status: Completed Specimen: Chest Updated: 10/10/22 1821    Narrative:      EXAMINATION:   TWO XRAY VIEWS OF THE CHEST     10/10/2022 4:23 pm     COMPARISON:   None. HISTORY:   ORDERING SYSTEM PROVIDED HISTORY: LE edema   TECHNOLOGIST PROVIDED HISTORY:   Reason for exam:->LE edema     FINDINGS:   The lungs are without acute focal process. There is no effusion or   pneumothorax. The cardiomediastinal silhouette is without acute process. The   osseous structures are without acute process. Impression:      No acute process. Assessment  1-Hyponatremia with a urine Na+<20 suggests a component of decreased effective renal perfusion. Component sec to Chronic Liver Disease. Pt treated with furosemide and NaCl overnight 10/10 now Na 134  PLAN:  1. Follow Na+  2. Continue NaCl 2 gram tid    2-Anemia and Thrombocytopenia in the setting of the Cirrhosis  Hgb 8.4-->7.7->7.0->7.2->7.0  PLT 89-->81->75->79->91K  B12, folate- WNL SPEP no monoclonal protein  Fe++ sat 4%  Plan:  Dose IV iron  GI planning EGD and C-scope 10/14      3-Proteinuria  possible UTI, possible reflects the acute inflammatory status or the cirrhosis  Microalb: Cr 42.4  Plan:  Follow     4- Primary HTN  BP goal <120/80-BP at goal  PLAN:  1. Follow BP    5. Thigh lesion-  Ortho has seen  S/P intervention with ID for I&D 10/13      Thank you for allowing us to participate in care of EDWAR Jewell CNP  10:21 AM  10/14/2022   Patient having C-Scope and EGD at the time of my visit  Agree with  formulation, assessment and plan as outlined above and directed by me. Addition and corrections were done as deemed appropriate. My exam and plan include:     A/P:  1. Continue current treatment as outlined above    AMINA Narvaez MD

## 2022-10-14 NOTE — BRIEF OP NOTE
Brief Postoperative Note    Reyna Edmond  YOB: 1955  [id number]    Procedure: EGD with biopsy    Anesthesia: [facility]    Surgeon:  Francisco Portillo MD    Findings:       Esophagus:  GERD      Stomach: 2 SMALL ANTRAL ULCERS.  Gastritis bx done to rule out H. Pylori      Duodenum:  DUODENITIS WITH SMALL BULB ULCER   Bx. done to rule out sprue       Complications: None      Estimated blood loss: none      Cordelia Burkitt, MD

## 2022-10-14 NOTE — BRIEF OP NOTE
Brief Postoperative Note    Ema Arciniega  YOB: 1955  [id number]    Procedure: Colonoscopy    Anesthesia: [facility]    Surgeon:  Justin Santiago MD    Findings: SUB OPTIMAL PREP. MULTIPLE AVMS ASCENDING COLON  Complications: None      Estimated blood loss: none      Follow up colonoscopy in 3 years.       Brayan Akers MD

## 2022-10-14 NOTE — ANESTHESIA POSTPROCEDURE EVALUATION
Department of Anesthesiology  Postprocedure Note    Patient: Danielle Kruse  MRN: [id number]  [place]: 1955  Date of evaluation: 10/14/2022      Procedure Summary     Date: 10/14/22 Room / Location: [facility] 02 / 106 [facility]    Anesthesia Start: 1352 Anesthesia Stop: 1417    Procedures:       EGD BIOPSY      COLONOSCOPY DIAGNOSTIC Diagnosis:       Anemia, unspecified type      (Anemia, unspecified type [D64.9])    Surgeons: Ja Gaffney MD Responsible Provider: Jose Marin MD    Anesthesia Type: MAC ASA Status: 4          Anesthesia Type: MAC    Joaquim Phase I:      Joaquim Phase II:        Anesthesia Post Evaluation    Patient location during evaluation: PACU  Patient participation: complete - patient participated  Level of consciousness: responsive to light touch  Airway patency: patent  Nausea & Vomiting: no nausea and no vomiting  Complications: no  Cardiovascular status: hemodynamically stable  Respiratory status: acceptable  Hydration status: euvolemic

## 2022-10-14 NOTE — ANESTHESIA PRE PROCEDURE
Department of Anesthesiology  Preprocedure Note       Name:  Karena Vernon   Age:  79 y.o.  :  1955                                          MRN:  [id number]         Date:  10/14/2022      Surgeon: Liza Dominguez):  Don To MD    Procedure: Procedure(s):  EGD BIOPSY  COLONOSCOPY DIAGNOSTIC    Medications prior to admission:   Prior to Admission medications    Medication Sig Start Date End Date Taking? Authorizing Provider   linezolid (ZYVOX) 600 MG tablet Take 1 tablet by mouth every 12 hours for 14 days 10/14/22 10/28/22 Yes Charlene Segura MD   pyridoxine (B-6) 50 MG tablet Take 1 tablet by mouth daily for 14 days 10/14/22 10/28/22 Yes Hemant Tan DO   meloxicam (MOBIC) 7.5 MG tablet Take 7.5 mg by mouth daily   Yes Historical Provider, MD   chlorzoxazone (PARAFON FORTE) 500 MG tablet Take 500 mg by mouth 3 times daily   Yes Historical Provider, MD   lisinopril (PRINIVIL;ZESTRIL) 10 MG tablet Take 10 mg by mouth daily. Historical Provider, MD   ibuprofen (ADVIL;MOTRIN) 800 MG tablet Take 1 tablet by mouth every 8 hours as needed for Pain.  10/14/14   Radha Newsome MD       Current medications:    Current Facility-Administered Medications   Medication Dose Route Frequency Provider Last Rate Last Admin    linezolid (ZYVOX) tablet 600 mg  600 mg Oral 2 times per day Charlene Segura MD        vitamin B-6 (PYRIDOXINE) tablet 50 mg  50 mg Oral Daily Charlene Segura MD        pantoprazole (PROTONIX) tablet 40 mg  40 mg Oral QAM AC Uma Bailey, APRN - CNP        HYDROcodone-acetaminophen (NORCO) 5-325 MG per tablet 1 tablet  1 tablet Oral Q4H PRN Cory Connolly, DO   1 tablet at 10/14/22 1078    sodium chloride tablet 2 g  2 g Oral TID  Shelby Mirza MD   2 g at 10/13/22 1629    insulin lispro (HUMALOG) injection vial 0-4 Units  0-4 Units SubCUTAneous TID  Cory Connolly, DO   1 Units at 10/13/22 1627    insulin lispro (HUMALOG) injection vial 0-4 Units  0-4 Units SubCUTAneous Swapnil Parish DO           Allergies:  No Known Allergies    Problem List:    Patient Active Problem List   Diagnosis Code    Fall from other slipping, tripping, or stumbling W01. 0XXA    Groin strain S76.219A    Contusion T14. 8XXA    Hyponatremia E87.1       Past Medical History:        Diagnosis Date    Hypertension        Past Surgical History:  No past surgical history on file. Social History:    Social History     Tobacco Use    Smoking status: Former     Types: Cigarettes     Quit date: 3/21/2007     Years since quitting: 15.5    Smokeless tobacco: Never   Substance Use Topics    Alcohol use: No                                Counseling given: Not Answered      Vital Signs (Current):   Vitals:    10/13/22 1510 10/13/22 1945 10/14/22 0102 10/14/22 0643   BP: (!) 150/65 123/68  112/62   Pulse: 71 67  62   Resp: 18 18  18   Temp:  36.4 °C (97.6 °F)  36.3 °C (97.4 °F)   TempSrc:  Oral  Oral   SpO2: 98% 99%  99%   Weight:   141 lb 3 oz (64 kg)    Height:                                                  BP Readings from Last 3 Encounters:   10/14/22 112/62   03/21/17 (!) 170/94       NPO Status:                                                                                 BMI:   Wt Readings from Last 3 Encounters:   10/14/22 141 lb 3 oz (64 kg)   03/21/17 140 lb (63.5 kg)     Body mass index is 22.79 kg/m².     CBC:   Lab Results   Component Value Date/Time    WBC 4.5 10/14/2022 10:32 AM    RBC 3.62 10/14/2022 10:32 AM    HGB 7.0 10/14/2022 10:32 AM    HCT 25.7 10/14/2022 10:32 AM    MCV 71.0 10/14/2022 10:32 AM    RDW 18.3 10/14/2022 10:32 AM    PLT 91 10/14/2022 10:32 AM       CMP:   Lab Results   Component Value Date/Time     10/14/2022 10:32 AM    K 3.5 10/14/2022 10:32 AM    K 4.2 10/10/2022 04:16 PM    CL 98 10/14/2022 10:32 AM    CO2 25 10/14/2022 10:32 AM    BUN 17 10/14/2022 10:32 AM    CREATININE 0.6 10/14/2022 10:32 AM    GFRAA >60 10/14/2022 10:32 AM    LABGLOM >60 10/14/2022 10:32 AM    GLUCOSE 206 10/14/2022 10:32 AM    PROT 6.8 10/14/2022 10:32 AM    CALCIUM 8.7 10/14/2022 10:32 AM    BILITOT 0.7 10/14/2022 10:32 AM    ALKPHOS 82 10/14/2022 10:32 AM    AST 56 10/14/2022 10:32 AM    ALT 27 10/14/2022 10:32 AM       POC Tests: No results for input(s): POCGLU, POCNA, POCK, POCCL, POCBUN, POCHEMO, POCHCT in the last 72 hours. Coags:   Lab Results   Component Value Date/Time    PROTIME 19.2 10/14/2022 10:32 AM    INR 1.7 10/14/2022 10:32 AM       HCG (If Applicable): No results found for: PREGTESTUR, PREGSERUM, HCG, HCGQUANT     ABGs: No results found for: PHART, PO2ART, QTN6MIN, SPW3ZSZ, BEART, C0LINIFO     Type & Screen (If Applicable):  No results found for: LABABO, LABRH    Drug/Infectious Status (If Applicable):  No results found for: HIV, HEPCAB    COVID-19 Screening (If Applicable): No results found for: COVID19        Anesthesia Evaluation    Airway: Mallampati: II  TM distance: >3 FB   Neck ROM: full  Mouth opening: > = 3 FB   Dental:    (+) lower dentures and upper dentures      Pulmonary:normal exam                               Cardiovascular:            Rhythm: regular  Rate: abnormal                    Neuro/Psych:               GI/Hepatic/Renal:             Endo/Other:                     Abdominal:             Vascular:           Other Findings:           Anesthesia Plan        EDWAR Vaca - CRNA   10/14/2022

## 2022-10-14 NOTE — CONSULTS
[address] [place] [address] [facility]  Consultation Note     Admit Date: 10/10/2022  6:05 PM    Reason for Consult:   Abscess drained right thigh    Attending Physician:  Viktor Granda DO    HISTORY OF PRESENT ILLNESS:             The history is obtained from extensive review of available past medical records. The patient is a 79 y.o. female who is not known to the ID service. The patient presented to the ED at [facility] on 10/10/2022 complaining of right lower extremity pain and swelling associated to a lump in the right thigh. She had done some stretching and thinks she might have pulled a muscle on the right thigh a couple of weeks prior to the presentation. Seen by GI for liver cirrhosis. Seen by general surgery for elevated CEA and anemia. Seen by orthopedic surgery. Seen by orthopedic surgery. They asked IR to perform a CT-guided drainage. They obtain purulent drainage so ID was asked to see her in consultation. I ordered Cefazolin. I was not told about a urine culture with MRSA. The gram stain of the abscess is showing abundant GPC in clusters. Past Medical History:        Diagnosis Date    Hypertension      Past Surgical History:    No past surgical history on file. Current Medications:   Scheduled Meds:   pantoprazole  40 mg Oral QAM AC    ceFAZolin  1,000 mg IntraVENous Q8H    sodium chloride  2 g Oral TID WC    insulin lispro  0-4 Units SubCUTAneous TID WC    insulin lispro  0-4 Units SubCUTAneous Nightly     Continuous Infusions:  PRN Meds:HYDROcodone 5 mg - acetaminophen    Allergies:  Patient has no known allergies.     Social History:   Social History     Socioeconomic History    Marital status:      Spouse name: None    Number of children: None    Years of education: None    Highest education level: None   Tobacco Use    Smoking status: Former     Types: Cigarettes     Quit date: 3/21/2007     Years since quitting: 15.5    Smokeless tobacco: Never Substance and Sexual Activity    Alcohol use: No    Drug use: No      Pets: None  Travel: No  The patient lives with her  and son    Family History:   No family history on file. . Otherwise non-pertinent to the chief complaint. REVIEW OF SYSTEMS:    Constitutional: Negative for fevers, chills, diaphoresis  Neurologic: Negative   Psychiatric: Negative  Rheumatologic: Negative   Endocrine: Negative  Hematologic: Negative  Immunologic: Negative  ENT: Negative  Respiratory: Negative   Cardiovascular: Negative  GI: Negative  : Negative  Musculoskeletal: As in the HPI  Skin: No rashes. PHYSICAL EXAM:    Vitals:   /62   Pulse 62   Temp 97.4 °F (36.3 °C) (Oral)   Resp 18   Ht 5' 6\" (1.676 m)   Wt 141 lb 3 oz (64 kg)   SpO2 99%   BMI 22.79 kg/m²   Constitutional: The patient is awake, alert, and oriented. She is ambulating. No distress. Skin: Warm and dry. No rashes were noted. HEENT: Eyes show round, and reactive pupils. No jaundice. Moist mucous membranes, no ulcerations, no thrush. Neck: Supple to movements. No lymphadenopathy. Chest: No use of accessory muscles to breathe. Symmetrical expansion. Auscultation reveals no wheezing, crackles, or rhonchi. Cardiovascular: S1 and S2 are rhythmic and regular. No murmurs appreciated. Abdomen: Positive bowel sounds to auscultation. Benign to palpation. No masses felt. No hepatosplenomegaly. Extremities: No clubbing, no cyanosis, no edema. Right thigh shows a pigtail catheter with bloody/purulent fluid.   Lines: Peripheral.      CBC+dif:  Recent Labs     10/12/22  0215 10/13/22  0330   WBC 5.5 5.6   HGB 7.0* 7.2*   HCT 24.8* 25.7*   MCV 67.9* 69.5*   PLT 75* 79*   NEUTROABS 3.62 3.94     No results found for: CRP   No results found for: CRPHS  No results found for: SEDRATE  Lab Results   Component Value Date    ALT 25 10/13/2022    AST 64 (H) 10/13/2022    ALKPHOS 83 10/13/2022    BILITOT 0.8 10/13/2022     Lab Results   Component Value Date/Time     10/13/2022 03:30 AM    K 3.9 10/13/2022 03:30 AM    K 4.2 10/10/2022 04:16 PM    CL 99 10/13/2022 03:30 AM    CO2 25 10/13/2022 03:30 AM    BUN 26 10/13/2022 03:30 AM    CREATININE 0.6 10/13/2022 03:30 AM    GFRAA >60 10/13/2022 03:30 AM    LABGLOM >60 10/13/2022 03:30 AM    GLUCOSE 186 10/13/2022 03:30 AM    PROT 6.6 10/13/2022 03:30 AM    LABALBU 2.7 10/13/2022 03:30 AM    CALCIUM 8.3 10/13/2022 03:30 AM    BILITOT 0.8 10/13/2022 03:30 AM    ALKPHOS 83 10/13/2022 03:30 AM    AST 64 10/13/2022 03:30 AM    ALT 25 10/13/2022 03:30 AM       Lab Results   Component Value Date/Time    PROTIME 19.0 10/13/2022 03:30 AM    INR 1.7 10/13/2022 03:30 AM       Lab Results   Component Value Date/Time    TSH 0.878 10/11/2022 11:10 AM       Lab Results   Component Value Date/Time    COLORU DARK YELLOW 10/10/2022 07:09 PM    PHUR 5.5 10/10/2022 07:09 PM    WBCUA 0-1 10/10/2022 07:09 PM    RBCUA 0-1 10/10/2022 07:09 PM    BACTERIA FEW 10/10/2022 07:09 PM    CLARITYU Clear 10/10/2022 07:09 PM    SPECGRAV >=1.030 10/10/2022 07:09 PM    LEUKOCYTESUR Negative 10/10/2022 07:09 PM    UROBILINOGEN 1.0 10/10/2022 07:09 PM    BILIRUBINUR MODERATE 10/10/2022 07:09 PM    BLOODU Negative 10/10/2022 07:09 PM    GLUCOSEU 250 10/10/2022 07:09 PM       No results found for: HCO3, BE, O2SAT, PH, THGB, PCO2, PO2, TCO2  Radiology:  Reviewed    Microbiology:  Pending  No results for input(s): BC in the last 72 hours. No results for input(s): ORG in the last 72 hours. No results for input(s): Reyes Phi in the last 72 hours. No results for input(s): STREPNEUMAGU in the last 72 hours. No results for input(s): LP1UAG in the last 72 hours. No results for input(s): ASO in the last 72 hours. No results for input(s): CULTRESP in the last 72 hours. No results for input(s): PROCAL in the last 72 hours. Assessment:  Right thigh abscess, probably caused by MRSA. Status post CT-guided drainage. Cultures are pending.   Gram stain showing abundant GPC in clusters  Asymptomatic bacteriuria with MRSA  Chronic hepatitis C infection with history of liver cirrhosis    Plan:    Start oral Linezolid and vitamin B6  Stop Cefazolin  Check cultures, baseline ESR, CRP  Contact isolation is not warranted since she is continent of urine and the abscess is contained  Will follow with you    Thank you for having us see this patient in consultation. I will be discussing this case with the treating physicians. Spoke with nursing.     Howie Guajardo MD  11:20 AM  10/14/2022

## 2022-10-15 LAB
ALBUMIN SERPL-MCNC: 2.9 G/DL (ref 3.5–5.2)
ALP BLD-CCNC: 85 U/L (ref 35–104)
ALT SERPL-CCNC: 28 U/L (ref 0–32)
AMMONIA: 21.7 UMOL/L (ref 11–51)
ANION GAP SERPL CALCULATED.3IONS-SCNC: 9 MMOL/L (ref 7–16)
AST SERPL-CCNC: 59 U/L (ref 0–31)
BILIRUB SERPL-MCNC: 0.7 MG/DL (ref 0–1.2)
BUN BLDV-MCNC: 15 MG/DL (ref 6–23)
CALCIUM SERPL-MCNC: 8.6 MG/DL (ref 8.6–10.2)
CHLORIDE BLD-SCNC: 98 MMOL/L (ref 98–107)
CLOTEST: NORMAL
CO2: 26 MMOL/L (ref 22–29)
CREAT SERPL-MCNC: 0.6 MG/DL (ref 0.5–1)
GFR AFRICAN AMERICAN: >60
GFR NON-AFRICAN AMERICAN: >60 ML/MIN/1.73
GLUCOSE BLD-MCNC: 300 MG/DL (ref 74–99)
HCT VFR BLD CALC: 25.6 % (ref 34–48)
HCV QNT BY NAAT IU/ML: ABNORMAL IU/ML
HCV QNT BY NAAT LOG IU/ML: 6.2 LOG IU/ML
HEMOGLOBIN: 7.1 G/DL (ref 11.5–15.5)
INR BLD: 1.7
INTERPRETATION: DETECTED
MCH RBC QN AUTO: 19.9 PG (ref 26–35)
MCHC RBC AUTO-ENTMCNC: 27.7 % (ref 32–34.5)
MCV RBC AUTO: 71.9 FL (ref 80–99.9)
METER GLUCOSE: 175 MG/DL (ref 74–99)
METER GLUCOSE: 234 MG/DL (ref 74–99)
METER GLUCOSE: 260 MG/DL (ref 74–99)
METER GLUCOSE: 333 MG/DL (ref 74–99)
PDW BLD-RTO: 18.6 FL (ref 11.5–15)
PLATELET # BLD: 103 E9/L (ref 130–450)
PMV BLD AUTO: 10 FL (ref 7–12)
POTASSIUM SERPL-SCNC: 3.6 MMOL/L (ref 3.5–5)
PROTHROMBIN TIME: 19.2 SEC (ref 9.3–12.4)
RBC # BLD: 3.56 E12/L (ref 3.5–5.5)
SODIUM BLD-SCNC: 133 MMOL/L (ref 132–146)
TOTAL PROTEIN: 7.1 G/DL (ref 6.4–8.3)
WBC # BLD: 4.4 E9/L (ref 4.5–11.5)

## 2022-10-15 PROCEDURE — 80053 COMPREHEN METABOLIC PANEL: CPT

## 2022-10-15 PROCEDURE — 6370000000 HC RX 637 (ALT 250 FOR IP): Performed by: SPECIALIST

## 2022-10-15 PROCEDURE — 6370000000 HC RX 637 (ALT 250 FOR IP): Performed by: GENERAL PRACTICE

## 2022-10-15 PROCEDURE — 6370000000 HC RX 637 (ALT 250 FOR IP): Performed by: NURSE PRACTITIONER

## 2022-10-15 PROCEDURE — 82140 ASSAY OF AMMONIA: CPT

## 2022-10-15 PROCEDURE — 36415 COLL VENOUS BLD VENIPUNCTURE: CPT

## 2022-10-15 PROCEDURE — 85610 PROTHROMBIN TIME: CPT

## 2022-10-15 PROCEDURE — 85027 COMPLETE CBC AUTOMATED: CPT

## 2022-10-15 PROCEDURE — 6370000000 HC RX 637 (ALT 250 FOR IP): Performed by: INTERNAL MEDICINE

## 2022-10-15 PROCEDURE — 2060000000 HC ICU INTERMEDIATE R&B

## 2022-10-15 PROCEDURE — 82962 GLUCOSE BLOOD TEST: CPT

## 2022-10-15 RX ORDER — PANTOPRAZOLE SODIUM 40 MG/1
40 TABLET, DELAYED RELEASE ORAL
Status: DISCONTINUED | OUTPATIENT
Start: 2022-10-15 | End: 2022-10-16 | Stop reason: HOSPADM

## 2022-10-15 RX ADMIN — METFORMIN HYDROCHLORIDE 500 MG: 500 TABLET ORAL at 16:26

## 2022-10-15 RX ADMIN — INSULIN LISPRO 3 UNITS: 100 INJECTION, SOLUTION INTRAVENOUS; SUBCUTANEOUS at 11:55

## 2022-10-15 RX ADMIN — INSULIN LISPRO 2 UNITS: 100 INJECTION, SOLUTION INTRAVENOUS; SUBCUTANEOUS at 06:01

## 2022-10-15 RX ADMIN — LINEZOLID 600 MG: 600 TABLET, FILM COATED ORAL at 08:26

## 2022-10-15 RX ADMIN — INSULIN LISPRO 1 UNITS: 100 INJECTION, SOLUTION INTRAVENOUS; SUBCUTANEOUS at 16:26

## 2022-10-15 RX ADMIN — LINEZOLID 600 MG: 600 TABLET, FILM COATED ORAL at 20:02

## 2022-10-15 RX ADMIN — METFORMIN HYDROCHLORIDE 500 MG: 500 TABLET ORAL at 11:49

## 2022-10-15 RX ADMIN — Medication 2 G: at 08:26

## 2022-10-15 RX ADMIN — PYRIDOXINE HCL TAB 50 MG 50 MG: 50 TAB at 08:26

## 2022-10-15 RX ADMIN — HYDROCODONE BITARTRATE AND ACETAMINOPHEN 1 TABLET: 5; 325 TABLET ORAL at 20:02

## 2022-10-15 RX ADMIN — PANTOPRAZOLE SODIUM 40 MG: 40 TABLET, DELAYED RELEASE ORAL at 16:26

## 2022-10-15 RX ADMIN — PANTOPRAZOLE SODIUM 40 MG: 40 TABLET, DELAYED RELEASE ORAL at 05:58

## 2022-10-15 RX ADMIN — HYDROCODONE BITARTRATE AND ACETAMINOPHEN 1 TABLET: 5; 325 TABLET ORAL at 14:39

## 2022-10-15 RX ADMIN — HYDROCODONE BITARTRATE AND ACETAMINOPHEN 1 TABLET: 5; 325 TABLET ORAL at 08:29

## 2022-10-15 ASSESSMENT — PAIN SCALES - GENERAL
PAINLEVEL_OUTOF10: 6
PAINLEVEL_OUTOF10: 6

## 2022-10-15 NOTE — PROGRESS NOTES
[facility]  Nephrology Progress Note    Patient's Name: Arcadio Barney    History of Present Illness from 10/11 Consult Note:    Golden Officer is a 79 y.o. female with prior history of HTN on lisinopril as an outpt. Pt presented to the ED with the recent Hx of LE edema and fatigue. She denied CP or SOB. She has no N/V/D. She uses no NSAID. She denies lightheadedness or dizziness. She has no urinary tract symptoms. CT Scan showed cirrhosis with ascites as well as a renal medullary density, Na+ 122, HgB 8.4, PLT 89\"    Subjective:    10/15: Patient seen and examined. She denies any chest pain or shortness of breath. Denies any abdominal pain or nausea. PMH:    Past Medical History:   Diagnosis Date    Hypertension        Patient Active Problem List   Diagnosis    Fall from other slipping, tripping, or stumbling    Groin strain    Contusion    Hyponatremia       Meds:     pantoprazole  40 mg Oral BID AC    metFORMIN  500 mg Oral BID WC    linezolid  600 mg Oral 2 times per day    vitamin B-6  50 mg Oral Daily    sodium chloride  2 g Oral TID WC    insulin lispro  0-4 Units SubCUTAneous TID WC    insulin lispro  0-4 Units SubCUTAneous Nightly           Meds prn:     HYDROcodone 5 mg - acetaminophen    Meds prior to admission:     No current facility-administered medications on file prior to encounter. Current Outpatient Medications on File Prior to Encounter   Medication Sig Dispense Refill    meloxicam (MOBIC) 7.5 MG tablet Take 7.5 mg by mouth daily      chlorzoxazone (PARAFON FORTE) 500 MG tablet Take 500 mg by mouth 3 times daily      lisinopril (PRINIVIL;ZESTRIL) 10 MG tablet Take 10 mg by mouth daily. ibuprofen (ADVIL;MOTRIN) 800 MG tablet Take 1 tablet by mouth every 8 hours as needed for Pain. 21 tablet 0       Allergies:    Patient has no known allergies. Social History:     reports that she quit smoking about 15 years ago. Her smoking use included cigarettes.  She has never used smokeless tobacco. She reports that she does not drink alcohol and does not use drugs. Family History:     No family history on file. Physical Exam:      Patient Vitals for the past 24 hrs:   BP Temp Temp src Pulse Resp SpO2   10/15/22 0625 137/78 98 °F (36.7 °C) Oral 66 18 100 %   10/14/22 2015 (!) 141/75 97.7 °F (36.5 °C) Oral 70 16 97 %   10/14/22 1455 121/61 -- -- 66 16 98 %   10/14/22 1440 (!) 102/51 -- -- 63 16 98 %   10/14/22 1425 (!) 104/51 -- -- 64 16 98 %         Intake/Output Summary (Last 24 hours) at 10/15/2022 1330  Last data filed at 10/14/2022 1414  Gross per 24 hour   Intake 300 ml   Output --   Net 300 ml       General: Awake, alert, no acute distress  Neck: No JVD noted  Lungs: Clear bilaterally upper, diminished to the bases bilaterally. Unlabored  CV: Regular rate and rhythm. No rub  Abd: Soft, nontender, nondistended. Active bowel sounds  Skin: Warm and dry.   No rash on exposed extremities  Ext: No edema   Neuro: Awake, answers questions appropriately    Data:    Recent Labs     10/13/22  0330 10/14/22  1032 10/15/22  1010   WBC 5.6 4.5 4.4*   HGB 7.2* 7.0* 7.1*   HCT 25.7* 25.7* 25.6*   MCV 69.5* 71.0* 71.9*   PLT 79* 91* 103*       Recent Labs     10/13/22  0330 10/14/22  1032 10/15/22  1010   * 134 133   K 3.9 3.5 3.6   CL 99 98 98   CO2 25 25 26   CREATININE 0.6 0.6 0.6   BUN 26* 17 15   LABGLOM >60 >60 >60   GLUCOSE 186* 206* 300*   CALCIUM 8.3* 8.7 8.6       No results found for: VITD25    No results found for: PTH    Recent Labs     10/13/22  0330 10/14/22  1032 10/15/22  1010   ALT 25 27 28   AST 64* 56* 59*   ALKPHOS 83 82 85   BILITOT 0.8 0.7 0.7       Recent Labs     10/13/22  0330 10/14/22  1032 10/15/22  1010   LABALBU 2.7* 2.9* 2.9*       Ferritin   Date Value Ref Range Status   10/11/2022 25 ng/mL Final     Comment:     FERRITIN Reference Ranges:  Adult Males   20 - 60 years:    30 - 400 ng/mL  Adult females 16 - 60 years:    13 - 150 ng/mL  Adults greater than 60 years: no established reference range  Pediatrics:                     no established reference range       Iron   Date Value Ref Range Status   10/11/2022 13 (L) 37 - 145 mcg/dL Final     TIBC   Date Value Ref Range Status   10/11/2022 310 250 - 450 mcg/dL Final       Vitamin B-12   Date Value Ref Range Status   10/12/2022 881 211 - 946 pg/mL Final       Folate   Date Value Ref Range Status   10/12/2022 10.4 4.8 - 24.2 ng/mL Final       Lab Results   Component Value Date/Time    COLORU DARK YELLOW 10/10/2022 07:09 PM    NITRU POSITIVE 10/10/2022 07:09 PM    GLUCOSEU 250 10/10/2022 07:09 PM    KETUA TRACE 10/10/2022 07:09 PM    UROBILINOGEN 1.0 10/10/2022 07:09 PM    BILIRUBINUR MODERATE 10/10/2022 07:09 PM       Lab Results   Component Value Date/Time    OSMOU 111 10/10/2022 07:09 PM       No components found for: URIC    No results found for: LIPIDPAN    Assessment and Plans:    Hyponatremia  with a urine Na+<20 suggests a component of decreased effective renal perfusion. Component sec to Chronic Liver Disease  S/p lasix and NaCl 10/10  Na 122 on 10/10-->133 today   PLAN:  1. On sodium chloride 2 g oral 3 times daily  2. Monitor labs    2. Anemia and thrombocytopenia  Likely in setting of cirrhosis  Hemoglobin 7 and platelets 27V on 63/96--> hgb 7.1 and platelets 236Y today  B12, folate- WNL SPEP no monoclonal protein  Fe++ sat 4%, iron 13  S/p Ferrlecit  S/p colonoscopy and EGD 10/14  PLAN:  1. Transfuse for hemoglobin<7  2. Monitor H&H    3. Proteinuria possible UTI, possible reflects the acute inflammatory status or the cirrhosis  Microalb: Cr 42.4  Plan:  Follow      4. Essential HTN  BP goal <120/80  BP near goal  PLAN:  1. Follow BP     5. Thigh lesion, right  S/p biopsy and I&D 9/13  Orthopedic surgery previously following    EDWAR Nunez - CNP  Patient seen and examined. I had a face to face encounter with the patient.  Pt states the NaCl tablets very difficult to take  Agree with exam.    Agree with  formulation, assessment and plan as outlined above and directed by me. Addition and corrections were done as deemed appropriate.    My exam and plan include:     A/P:  Hyponatremia-Na+ 133-will hold oral NaCl and follow Na+  Continue current treatment as outlined above    AMINA Narvaez MD

## 2022-10-15 NOTE — PROGRESS NOTES
Patient seen feels better. Abscess still draining. Tolerating meds. Scopes noted.  Will start metformin for blood sugar

## 2022-10-15 NOTE — OP NOTE
[id number] [address]                                OPERATIVE REPORT    PATIENT NAME: Arturo Hernandez                          :        1955  MED REC NO:   [id number]                            ROOM:       8689  ACCOUNT NO:   [de-identified]                           ADMIT DATE: 10/10/2022  PROVIDER:     Sosa Martini MD    DATE OF PROCEDURE:  10/14/2022    PROCEDURE PERFORMED:  Upper endoscopy with biopsy and colonoscopy. PREOPERATIVE DIAGNOSIS:  Evaluation for anemia. POSTOPERATIVE DIAGNOSES:  Upper endoscopy showed grade B LA  classification GERD. Two small gastric ulcers in the antrum, duodenal  bulb ulcer, and duodenitis. Biopsies were done to rule out H. pylori  infection and to rule out sprue. Colonoscopy showed suboptimal prep and  multiple telangiectatic lesions at the ascending colon. No active  bleeding was seen. Followup is recommended in the near future with  better prep if anemia persists. ANESTHESIA:  LMAC. ESTIMATED BLOOD LOSS:  N/A    NOTE:  Prior to the procedure an informed consent was obtained from the  patient after explaining the benefits as well as the risks,  alternatives, and complications of the procedure to the patient, who  understood and agreed. PROCEDURE:  With the patient in the left lateral decubitus position, the  Olympus GIF-100 forward-viewing videoscope was introduced into the  esophagus, the evaluation of which showed grade B LA classification GERD  and no hiatal hernia was seen. The scope was then advanced through the gastroesophageal junction into  the gastric body, along the greater curvature. Evaluation of the body  of the stomach showed two antral ulcers and gastritis, biopsied to rule  out H. pylori infection. The scope was then advanced through the pylorus into the duodenal bulb  and second portion of the duodenum.   Duodenum showed duodenal bulb ulcer  and duodenitis. Biopsies were done to rule out sprue. The scope was  then retrieved and retroflexed in the prepyloric antrum, with thorough  evaluation of the cardiac and fundal portions of the stomach, which  appeared to be within normal limits. The scope was then straightened, the area deflated, and the procedure  was terminated by withdrawing the scope and conducting a second look on  the way out, which was essentially the same. The patient tolerated the procedure well. The patient's position was changed into that of the colonoscopy  position. PROCEDURE:  With the patient in the left lateral decubitus position, the  digital rectal examination was carried out and was essentially  unremarkable. The Olympus video colonoscope was then introduced into the anal canal,  anorectal area, rectosigmoid, sigmoid and descending colon, advanced  around the splenic flexure into the transverse colon, hepatic flexure,  ascending colon, from which into the cecum. The cecum was identified by the presence of the ileocecal valve,  appendiceal orifice, and light at the right lower quadrant. Evaluation  to the cecum showed suboptimal prep and multiple telangiectatic lesions  at the ascending colon, but no active bleeding was seen. Otherwise,  there was no mucosal or mass lesion to be seen. The scope was then retrieved, decompressing the cecum, ascending colon,  transverse, and descending colon, conducting a second look on the way  out which was essentially unremarkable. The scope was then retroflexed  in the rectum. Retroflexion in the rectum showed internal hemorrhoids. The scope was then straightened, the area deflated, and the procedure  was terminated. The patient tolerated the procedure well.         Aleena Hendrix MD    D: 10/14/2022 15:02:52       T: 10/14/2022 15:07:29     [email or web address]  Job#: [id number]     Doc#: [id number]    CC:  Corinna Adame MD

## 2022-10-15 NOTE — PROGRESS NOTES
Department of Orthopedic Surgery  Trauma / Fracture Care   Attending Progress Note        Subjective: Patient resting comfortably in bed. She states she is still having soreness in her thigh but pain is overall improved. Vitals  VITALS:  /78   Pulse 66   Temp 98 °F (36.7 °C) (Oral)   Resp 18   Ht 5' 6\" (1.676 m)   Wt 141 lb 3 oz (64 kg)   SpO2 100%   BMI 22.79 kg/m²   DRAIN/TUBE OUTPUT:  Open Drain 10/13/22 Right; Anterior Thigh-Output (ml):  (Pt returned from EGD/coloscopy with bag open and empty, output all over bed and floor.)    PHYSICAL EXAM:    Orientation:  alert and oriented to person, place and time    Right Lower Extremity    Compartments: soft    Upper extremity Motor: 5/5 axillary, mc,m,r,u,ain,pin    Upper extremity sensory: grossly in tact    Lower Extremity Motor :   Quadriceps:  5/5  Extensor hallucis:  5/5  Dorsiflexion:  5/5  Plantarflexion:  5/5    Lower Extremity Sensory:  Tibial Nerve:  intact  Superficial Peroneal Nerve:  intact  Deep Peroneal Nerve:  intact    Pulses:    Posterior Tibial:  2  Dorsalis Pedis:  2  Posterior Tibial Artery:  2    Abnormal Exam findings:  none  LABS:    HgB:    Lab Results   Component Value Date    HGB 10.0 8/28/2011     INR:    No components found with this basename: PTPATIENT, PTINR     CBC:   Lab Results   Component Value Date/Time    WBC 4.4 10/15/2022 10:10 AM    RBC 3.56 10/15/2022 10:10 AM    HGB 7.1 10/15/2022 10:10 AM    HCT 25.6 10/15/2022 10:10 AM    MCV 71.9 10/15/2022 10:10 AM    MCH 19.9 10/15/2022 10:10 AM    MCHC 27.7 10/15/2022 10:10 AM    RDW 18.6 10/15/2022 10:10 AM     10/15/2022 10:10 AM    MPV 10.0 10/15/2022 10:10 AM       ASSESSMENT AND PLAN:    Post operative day 2 status post right thigh abscess I&D with interventional radiology    1:  Weight bearing as tolerated  2: ID on board. Patient receiving appropriate antibiotic therapy.   Drain placed via interventional radiology continues to drain small amounts of purulent fluid. No orthopedic intervention planned at this time. Patient may follow-up as needed.     Lora Monday, DO  10/15/2022  1:45 PM

## 2022-10-15 NOTE — PROGRESS NOTES
PROGRESS NOTE        Patient Presents with/Seen in Consultation For      *Reason for Consult: cirrhosis   CHIEF COMPLAINT:  lower leg edema     Subjective:     Patient seen laying in bed, awake. Denies any discomfort. Tolerated her breakfast, poor appetite. Denies any N/V. Panendoscopy reviewed with the patient. Continued PPI use reviewed with the patient, all questions answered. Review of Systems  Aside from what was mentioned in the PMH and HPI, essentially unremarkable, all others negative. Objective:     /78   Pulse 66   Temp 98 °F (36.7 °C) (Oral)   Resp 18   Ht 5' 6\" (1.676 m)   Wt 141 lb 3 oz (64 kg)   SpO2 100%   BMI 22.79 kg/m²     General appearance: alert, awake, laying in bed, and cooperative  Eyes: conjunctiva pale, sclera anicteric. PERRL.   Lungs: clear to auscultation bilaterally  Heart: regular rate and rhythm, no murmur, 2+ pulses; no edema  Abdomen: soft, non-tender; bowel sounds normal  Extremities: extremities without edema  Pulses: 2+ and symmetric  Skin: Skin color, texture, turgor normal.   Neurologic: Grossly normal    linezolid (ZYVOX) tablet 600 mg, 2 times per day  vitamin B-6 (PYRIDOXINE) tablet 50 mg, Daily  pantoprazole (PROTONIX) tablet 40 mg, QAM AC  HYDROcodone-acetaminophen (NORCO) 5-325 MG per tablet 1 tablet, Q4H PRN  sodium chloride tablet 2 g, TID WC  insulin lispro (HUMALOG) injection vial 0-4 Units, TID WC  insulin lispro (HUMALOG) injection vial 0-4 Units, Nightly       Data Review  CBC:   Lab Results   Component Value Date/Time    WBC 4.5 10/14/2022 10:32 AM    RBC 3.62 10/14/2022 10:32 AM    HGB 7.0 10/14/2022 10:32 AM    HCT 25.7 10/14/2022 10:32 AM    MCV 71.0 10/14/2022 10:32 AM    MCH 19.3 10/14/2022 10:32 AM    MCHC 27.2 10/14/2022 10:32 AM    RDW 18.3 10/14/2022 10:32 AM    PLT 91 10/14/2022 10:32 AM    MPV 10.7 10/14/2022 10:32 AM     CMP:    Lab Results   Component Value Date/Time     10/14/2022 10:32 AM    K 3.5 10/14/2022 10:32 AM    K 4.2 10/10/2022 04:16 PM    CL 98 10/14/2022 10:32 AM    CO2 25 10/14/2022 10:32 AM    BUN 17 10/14/2022 10:32 AM    CREATININE 0.6 10/14/2022 10:32 AM    GFRAA >60 10/14/2022 10:32 AM    LABGLOM >60 10/14/2022 10:32 AM    GLUCOSE 206 10/14/2022 10:32 AM    PROT 6.8 10/14/2022 10:32 AM    LABALBU 2.9 10/14/2022 10:32 AM    CALCIUM 8.7 10/14/2022 10:32 AM    BILITOT 0.7 10/14/2022 10:32 AM    ALKPHOS 82 10/14/2022 10:32 AM    AST 56 10/14/2022 10:32 AM    ALT 27 10/14/2022 10:32 AM     Hepatic Function Panel:    Lab Results   Component Value Date/Time    ALKPHOS 82 10/14/2022 10:32 AM    ALT 27 10/14/2022 10:32 AM    AST 56 10/14/2022 10:32 AM    PROT 6.8 10/14/2022 10:32 AM    BILITOT 0.7 10/14/2022 10:32 AM    LABALBU 2.9 10/14/2022 10:32 AM     No components found for: CHLPL  No results found for: TRIG    Lab Results   Component Value Date    HDL 11 10/12/2022       Lab Results   Component Value Date    LDLCALC 41 10/12/2022       Lab Results   Component Value Date    LABVLDL 23 10/12/2022      PT/INR:    Lab Results   Component Value Date/Time    PROTIME 19.2 10/14/2022 10:32 AM    INR 1.7 10/14/2022 10:32 AM     IRON:    Lab Results   Component Value Date/Time    IRON 13 10/11/2022 11:10 AM     Iron Saturation:  No components found for: PERCENTFE  FERRITIN:    Lab Results   Component Value Date/Time    FERRITIN 25 10/11/2022 11:10 AM         Assessment:     Active Problems:  Liver Cirrhosis  Ascites, minimal on CT  Bilateral lower leg edema   Cholelithiasis   Hyponatremia   Elevated lipase   Anemia, microcytic   Thrombocytopenia   Elevated CEA 10.8  EGD/Colon 10/14/22: SUB OPTIMAL PREP. MULTIPLE AVMS ASCENDING COLON. 2 SMALL ANTRAL ULCERS. Gastritis bx done to rule out H. Pylori.  Duodenum:  DUODENITIS WITH SMALL BULB ULCER   Bx. done to rule out sprue    Plan:     Hepatitis panel, hep c reactive, further labs ordered and pending  Diet as tolerated  Increase Protonix to BID dosing, continue x 8 weeks  Follow up instructions give to patient including the need for repeat EGD.   OK to DC from GI POV; when OK with others      Discussed with Dr. Chris Amrenta per Dr. Zonia Guevara, APRN - CNP  10/15/2022  8:30 AM For Dr. Alpa Young

## 2022-10-15 NOTE — PROGRESS NOTES
[address] [place] [address] [facility]  [facility]  Progress Note    SUBJECTIVE:  Chief Complaint   Patient presents with    Leg Swelling     bilat lower leg edema and pain x 2 wks     Patient is tolerating medications. No reported adverse drug reactions. No nausea, vomiting, diarrhea. In bed, has pain to right thigh around drain site  Concerned about her blood sugars running high   Afebrile     Review of systems:  As stated above in the chief complaint, otherwise negative. Medications:  Scheduled Meds:   pantoprazole  40 mg Oral BID AC    metFORMIN  500 mg Oral BID WC    linezolid  600 mg Oral 2 times per day    vitamin B-6  50 mg Oral Daily    sodium chloride  2 g Oral TID WC    insulin lispro  0-4 Units SubCUTAneous TID WC    insulin lispro  0-4 Units SubCUTAneous Nightly     Continuous Infusions:  PRN Meds:HYDROcodone 5 mg - acetaminophen    OBJECTIVE:  /78   Pulse 66   Temp 98 °F (36.7 °C) (Oral)   Resp 18   Ht 5' 6\" (1.676 m)   Wt 141 lb 3 oz (64 kg)   SpO2 100%   BMI 22.79 kg/m²   Temp  Av.9 °F (36.6 °C)  Min: 97.7 °F (36.5 °C)  Max: 98 °F (36.7 °C)  Constitutional: The patient is awake, alert, and oriented. In bed. In no distress. Skin: Warm and dry. No rashes were noted. HEENT: Round and reactive pupils. Moist mucous membranes. No ulcerations or thrush. Neck: Supple to movements. Chest: No use of accessory muscles to breathe. Symmetrical expansion. No wheezing, crackles or rhonchi. Cardiovascular: S1 and S2 are rhythmic and regular. No murmurs appreciated. Abdomen: Positive bowel sounds to auscultation. Benign to palpation. Extremities: No clubbing, no cyanosis, no edema.  Right thigh pigtail with purulent material   Lines: peripheral    Laboratory and Tests Review:  Lab Results   Component Value Date    WBC 4.4 (L) 10/15/2022    WBC 4.5 10/14/2022    WBC 5.6 10/13/2022    HGB 7.1 (L) 10/15/2022    HCT 25.6 (L) 10/15/2022    MCV 71.9 (L) 10/15/2022     (L) 10/15/2022     Lab Results   Component Value Date    NEUTROABS 3.78 10/14/2022    NEUTROABS 3.94 10/13/2022    NEUTROABS 3.62 10/12/2022     No results found for: CRPHS  Lab Results   Component Value Date    ALT 28 10/15/2022    AST 59 (H) 10/15/2022    ALKPHOS 85 10/15/2022    BILITOT 0.7 10/15/2022     Lab Results   Component Value Date/Time     10/15/2022 10:10 AM    K 3.6 10/15/2022 10:10 AM    K 4.2 10/10/2022 04:16 PM    CL 98 10/15/2022 10:10 AM    CO2 26 10/15/2022 10:10 AM    BUN 15 10/15/2022 10:10 AM    CREATININE 0.6 10/15/2022 10:10 AM    CREATININE 0.6 10/14/2022 10:32 AM    CREATININE 0.6 10/13/2022 03:30 AM    GFRAA >60 10/15/2022 10:10 AM    LABGLOM >60 10/15/2022 10:10 AM    GLUCOSE 300 10/15/2022 10:10 AM    PROT 7.1 10/15/2022 10:10 AM    LABALBU 2.9 10/15/2022 10:10 AM    CALCIUM 8.6 10/15/2022 10:10 AM    BILITOT 0.7 10/15/2022 10:10 AM    ALKPHOS 85 10/15/2022 10:10 AM    AST 59 10/15/2022 10:10 AM    ALT 28 10/15/2022 10:10 AM     No results found for: CRP  No results found for: [address]  Radiology:      Microbiology:   Urine Culture 10/10/22: MRSA  Body Fluid Culture 10/13/22: M_SA    ASSESSMENT:  Right thigh deep abscess, probably caused by MRSA. Status post CT-guided drainage. Cultures are Staph aureus. Asymptomatic bacteriuria with MRSA  Chronic hepatitis C infection with history of liver cirrhosis: will need outpatient treatment    PLAN:  Continue oral Linezolid and vitamin B6  Blood cultures x2 ordered   Check final cultures  Monitor labs    EDWAR Champagne - CNP  1:02 PM  10/15/2022       Pt seen and examined. Above discussed agree with advanced practice nurse. Labs, cultures, and radiographs reviewed. Face to Face encounter occurred. Changes made as necessary.      Ines Price MD

## 2022-10-16 VITALS
WEIGHT: 141.19 LBS | HEIGHT: 66 IN | OXYGEN SATURATION: 98 % | BODY MASS INDEX: 22.69 KG/M2 | TEMPERATURE: 98.4 F | DIASTOLIC BLOOD PRESSURE: 61 MMHG | HEART RATE: 73 BPM | RESPIRATION RATE: 16 BRPM | SYSTOLIC BLOOD PRESSURE: 114 MMHG

## 2022-10-16 LAB
ANION GAP SERPL CALCULATED.3IONS-SCNC: 7 MMOL/L (ref 7–16)
BODY FLUID CULTURE, STERILE: ABNORMAL
BUN BLDV-MCNC: 14 MG/DL (ref 6–23)
CALCIUM SERPL-MCNC: 8.7 MG/DL (ref 8.6–10.2)
CHLORIDE BLD-SCNC: 99 MMOL/L (ref 98–107)
CO2: 26 MMOL/L (ref 22–29)
CREAT SERPL-MCNC: 0.7 MG/DL (ref 0.5–1)
GFR AFRICAN AMERICAN: >60
GFR NON-AFRICAN AMERICAN: >60 ML/MIN/1.73
GLUCOSE BLD-MCNC: 318 MG/DL (ref 74–99)
GRAM STAIN RESULT: ABNORMAL
HCT VFR BLD CALC: 25.7 % (ref 34–48)
HEMOGLOBIN: 7.1 G/DL (ref 11.5–15.5)
METER GLUCOSE: 182 MG/DL (ref 74–99)
METER GLUCOSE: 212 MG/DL (ref 74–99)
METER GLUCOSE: 307 MG/DL (ref 74–99)
ORGANISM: ABNORMAL
POTASSIUM SERPL-SCNC: 3.6 MMOL/L (ref 3.5–5)
SODIUM BLD-SCNC: 132 MMOL/L (ref 132–146)

## 2022-10-16 PROCEDURE — 36415 COLL VENOUS BLD VENIPUNCTURE: CPT

## 2022-10-16 PROCEDURE — 82962 GLUCOSE BLOOD TEST: CPT

## 2022-10-16 PROCEDURE — 6370000000 HC RX 637 (ALT 250 FOR IP): Performed by: GENERAL PRACTICE

## 2022-10-16 PROCEDURE — 85014 HEMATOCRIT: CPT

## 2022-10-16 PROCEDURE — 87040 BLOOD CULTURE FOR BACTERIA: CPT

## 2022-10-16 PROCEDURE — 6370000000 HC RX 637 (ALT 250 FOR IP): Performed by: SPECIALIST

## 2022-10-16 PROCEDURE — 85018 HEMOGLOBIN: CPT

## 2022-10-16 PROCEDURE — 6370000000 HC RX 637 (ALT 250 FOR IP): Performed by: NURSE PRACTITIONER

## 2022-10-16 PROCEDURE — 80048 BASIC METABOLIC PNL TOTAL CA: CPT

## 2022-10-16 RX ORDER — PYRIDOXINE HCL (VITAMIN B6) 50 MG
50 TABLET ORAL DAILY
Qty: 30 TABLET | Refills: 3 | Status: SHIPPED | OUTPATIENT
Start: 2022-10-16 | End: 2022-10-30

## 2022-10-16 RX ORDER — FERROUS SULFATE 325(65) MG
325 TABLET ORAL 2 TIMES DAILY WITH MEALS
Status: DISCONTINUED | OUTPATIENT
Start: 2022-10-16 | End: 2022-10-16 | Stop reason: HOSPADM

## 2022-10-16 RX ORDER — PANTOPRAZOLE SODIUM 40 MG/1
40 TABLET, DELAYED RELEASE ORAL
Qty: 30 TABLET | Refills: 3 | Status: SHIPPED | OUTPATIENT
Start: 2022-10-16

## 2022-10-16 RX ORDER — FERROUS SULFATE 325(65) MG
325 TABLET ORAL 2 TIMES DAILY WITH MEALS
Qty: 30 TABLET | Refills: 3 | Status: SHIPPED | OUTPATIENT
Start: 2022-10-16

## 2022-10-16 RX ADMIN — EMPAGLIFLOZIN 10 MG: 10 TABLET, FILM COATED ORAL at 16:23

## 2022-10-16 RX ADMIN — METFORMIN HYDROCHLORIDE 500 MG: 500 TABLET ORAL at 08:18

## 2022-10-16 RX ADMIN — PANTOPRAZOLE SODIUM 40 MG: 40 TABLET, DELAYED RELEASE ORAL at 06:10

## 2022-10-16 RX ADMIN — PYRIDOXINE HCL TAB 50 MG 50 MG: 50 TAB at 08:18

## 2022-10-16 RX ADMIN — INSULIN LISPRO 3 UNITS: 100 INJECTION, SOLUTION INTRAVENOUS; SUBCUTANEOUS at 10:58

## 2022-10-16 RX ADMIN — HYDROCODONE BITARTRATE AND ACETAMINOPHEN 1 TABLET: 5; 325 TABLET ORAL at 16:41

## 2022-10-16 RX ADMIN — FERROUS SULFATE TAB 325 MG (65 MG ELEMENTAL FE) 325 MG: 325 (65 FE) TAB at 16:23

## 2022-10-16 RX ADMIN — LINEZOLID 600 MG: 600 TABLET, FILM COATED ORAL at 08:18

## 2022-10-16 RX ADMIN — METFORMIN HYDROCHLORIDE 500 MG: 500 TABLET ORAL at 16:23

## 2022-10-16 RX ADMIN — INSULIN LISPRO 1 UNITS: 100 INJECTION, SOLUTION INTRAVENOUS; SUBCUTANEOUS at 06:14

## 2022-10-16 RX ADMIN — HYDROCODONE BITARTRATE AND ACETAMINOPHEN 1 TABLET: 5; 325 TABLET ORAL at 06:10

## 2022-10-16 RX ADMIN — PANTOPRAZOLE SODIUM 40 MG: 40 TABLET, DELAYED RELEASE ORAL at 15:06

## 2022-10-16 ASSESSMENT — PAIN SCALES - GENERAL
PAINLEVEL_OUTOF10: 0
PAINLEVEL_OUTOF10: 0

## 2022-10-16 NOTE — PROGRESS NOTES
[facility]  [facility]  Progress Note    SUBJECTIVE:  Chief Complaint   Patient presents with    Leg Swelling     bilat lower leg edema and pain x 2 wks     Patient is tolerating medications. No reported adverse drug reactions. No nausea, vomiting, diarrhea. She is up walking around room  States her RLE is slightly better  Afebrile     Review of systems:  As stated above in the chief complaint, otherwise negative. Medications:  Scheduled Meds:   pantoprazole  40 mg Oral BID AC    metFORMIN  500 mg Oral BID WC    linezolid  600 mg Oral 2 times per day    vitamin B-6  50 mg Oral Daily    [Held by provider] sodium chloride  2 g Oral TID WC    insulin lispro  0-4 Units SubCUTAneous TID WC    insulin lispro  0-4 Units SubCUTAneous Nightly     Continuous Infusions:  PRN Meds:HYDROcodone 5 mg - acetaminophen    OBJECTIVE:  /61   Pulse 73   Temp 98.4 °F (36.9 °C) (Oral)   Resp 16   Ht 5' 6\" (1.676 m)   Wt 141 lb 3 oz (64 kg)   SpO2 98%   BMI 22.79 kg/m²   Temp  Av.5 °F (36.9 °C)  Min: 98.3 °F (36.8 °C)  Max: 98.6 °F (37 °C)  Constitutional: The patient is awake, alert, and oriented. Up in room. In no distress. Skin: Warm and dry. No rashes were noted. HEENT: Round and reactive pupils. Moist mucous membranes. No ulcerations or thrush. Neck: Supple to movements. Chest: No use of accessory muscles to breathe. Symmetrical expansion. No wheezing, crackles or rhonchi. Cardiovascular: S1 and S2 are rhythmic and regular. No murmurs appreciated. Abdomen: Positive bowel sounds to auscultation. Benign to palpation. Extremities: No clubbing, no cyanosis, no edema.  Right thigh pigtail with purulent material   Lines: peripheral    Laboratory and Tests Review:  Lab Results   Component Value Date    WBC 4.4 (L) 10/15/2022    WBC 4.5 10/14/2022    WBC 5.6 10/13/2022    HGB 7.1 (L) 10/16/2022    HCT 25.7 (L) 10/16/2022    MCV 71.9 (L) 10/15/2022     (L) 10/15/2022 Lab Results   Component Value Date    NEUTROABS 3.78 10/14/2022    NEUTROABS 3.94 10/13/2022    NEUTROABS 3.62 10/12/2022     No results found for: CRPHS  Lab Results   Component Value Date    ALT 28 10/15/2022    AST 59 (H) 10/15/2022    ALKPHOS 85 10/15/2022    BILITOT 0.7 10/15/2022     Lab Results   Component Value Date/Time     10/16/2022 09:55 AM    K 3.6 10/16/2022 09:55 AM    K 4.2 10/10/2022 04:16 PM    CL 99 10/16/2022 09:55 AM    CO2 26 10/16/2022 09:55 AM    BUN 14 10/16/2022 09:55 AM    CREATININE 0.7 10/16/2022 09:55 AM    CREATININE 0.6 10/15/2022 10:10 AM    CREATININE 0.6 10/14/2022 10:32 AM    GFRAA >60 10/16/2022 09:55 AM    LABGLOM >60 10/16/2022 09:55 AM    GLUCOSE 318 10/16/2022 09:55 AM    PROT 7.1 10/15/2022 10:10 AM    LABALBU 2.9 10/15/2022 10:10 AM    CALCIUM 8.7 10/16/2022 09:55 AM    BILITOT 0.7 10/15/2022 10:10 AM    ALKPHOS 85 10/15/2022 10:10 AM    AST 59 10/15/2022 10:10 AM    ALT 28 10/15/2022 10:10 AM     No results found for: CRP  No results found for: [address]  Radiology:      Microbiology:   Urine Culture 10/10/22: MRSA  Body Fluid Culture 10/13/22: MRSA  Blood cultures pending     ASSESSMENT:  Right thigh deep abscess, probably caused by MRSA. Status post CT-guided drainage. Cultures are Staph aureus. Asymptomatic bacteriuria with MRSA  Chronic hepatitis C infection with history of liver cirrhosis: will need outpatient treatment    PLAN:  Continue oral Linezolid and vitamin B6  Blood cultures pending   Check final cultures  Monitor labs  Okay to discharge from ID POV, scripts in chart  F/U with Dr. Eden Ascencio in 2 weeks    EDWAR Bean - CNP  11:23 AM  10/16/2022     Pt seen and examined. Above discussed agree with advanced practice nurse. Labs, cultures, and radiographs reviewed. Face to Face encounter occurred. Changes made as necessary.      Navi Toth MD

## 2022-10-16 NOTE — PROGRESS NOTES
[facility]  Nephrology Progress Note    Patient's Name: Helen Menendez    History of Present Illness from 10/11 Consult Note:    Natty Nascimento is a 79 y.o. female with prior history of HTN on lisinopril as an outpt. Pt presented to the ED with the recent Hx of LE edema and fatigue. She denied CP or SOB. She has no N/V/D. She uses no NSAID. She denies lightheadedness or dizziness. She has no urinary tract symptoms. CT Scan showed cirrhosis with ascites as well as a renal medullary density, Na+ 122, HgB 8.4, PLT 89\"    Subjective:    10/16: Patient was seen and examined. She reports that she feels good. She denies any issues overnight. She denies any chest pain or shortness of breath. She denies any abdominal pain or nausea. PMH:    Past Medical History:   Diagnosis Date    Hypertension        Patient Active Problem List   Diagnosis    Fall from other slipping, tripping, or stumbling    Groin strain    Contusion    Hyponatremia       Meds:     pantoprazole  40 mg Oral BID AC    metFORMIN  500 mg Oral BID WC    linezolid  600 mg Oral 2 times per day    vitamin B-6  50 mg Oral Daily    [Held by provider] sodium chloride  2 g Oral TID WC    insulin lispro  0-4 Units SubCUTAneous TID WC    insulin lispro  0-4 Units SubCUTAneous Nightly       Meds prn:     HYDROcodone 5 mg - acetaminophen    Meds prior to admission:     No current facility-administered medications on file prior to encounter. Current Outpatient Medications on File Prior to Encounter   Medication Sig Dispense Refill    meloxicam (MOBIC) 7.5 MG tablet Take 7.5 mg by mouth daily      chlorzoxazone (PARAFON FORTE) 500 MG tablet Take 500 mg by mouth 3 times daily      lisinopril (PRINIVIL;ZESTRIL) 10 MG tablet Take 10 mg by mouth daily. ibuprofen (ADVIL;MOTRIN) 800 MG tablet Take 1 tablet by mouth every 8 hours as needed for Pain. 21 tablet 0       Allergies:    Patient has no known allergies.     Social History:     reports that she quit smoking about 15 years ago. Her smoking use included cigarettes. She has never used smokeless tobacco. She reports that she does not drink alcohol and does not use drugs. Family History:     No family history on file. Physical Exam:      Patient Vitals for the past 24 hrs:   BP Temp Temp src Pulse Resp SpO2   10/16/22 0815 114/61 98.4 °F (36.9 °C) Oral 73 16 98 %   10/16/22 0255 136/73 98.3 °F (36.8 °C) Oral 70 17 98 %   10/1955 (!) 147/72 98.5 °F (36.9 °C) Oral 70 17 100 %   10/15/22 1430 135/62 98.6 °F (37 °C) Oral 69 16 99 %       No intake or output data in the 24 hours ending 10/16/22 0853      General: Awake, alert, no acute distress  Neck: No JVD noted  Lungs: Clear bilaterally upper, diminished to the bases bilaterally. Unlabored  CV: Regular rate and rhythm. No rub  Abd: Soft, nontender, nondistended. Active bowel sounds  Skin: Warm and dry.   No rash on exposed extremities  Ext: No edema   Neuro: Awake, answers questions appropriately    Data:    Recent Labs     10/14/22  1032 10/15/22  1010   WBC 4.5 4.4*   HGB 7.0* 7.1*   HCT 25.7* 25.6*   MCV 71.0* 71.9*   PLT 91* 103*         Recent Labs     10/14/22  1032 10/15/22  1010    133   K 3.5 3.6   CL 98 98   CO2 25 26   CREATININE 0.6 0.6   BUN 17 15   LABGLOM >60 >60   GLUCOSE 206* 300*   CALCIUM 8.7 8.6         No results found for: VITD25    No results found for: PTH    Recent Labs     10/14/22  1032 10/15/22  1010   ALT 27 28   AST 56* 59*   ALKPHOS 82 85   BILITOT 0.7 0.7         Recent Labs     10/14/22  1032 10/15/22  1010   LABALBU 2.9* 2.9*         Ferritin   Date Value Ref Range Status   10/11/2022 25 ng/mL Final     Comment:     FERRITIN Reference Ranges:  Adult Males   20 - 60 years:    30 - 400 ng/mL  Adult females 16 - 60 years:    15 - 150 ng/mL  Adults greater than 60 years:   no established reference range  Pediatrics:                     no established reference range       Iron   Date Value Ref Range Status   10/11/2022 13 (L) 37 - 145 mcg/dL Final     TIBC   Date Value Ref Range Status   10/11/2022 310 250 - 450 mcg/dL Final       Vitamin B-12   Date Value Ref Range Status   10/12/2022 881 211 - 946 pg/mL Final       Folate   Date Value Ref Range Status   10/12/2022 10.4 4.8 - 24.2 ng/mL Final       Lab Results   Component Value Date/Time    COLORU DARK YELLOW 10/10/2022 07:09 PM    NITRU POSITIVE 10/10/2022 07:09 PM    GLUCOSEU 250 10/10/2022 07:09 PM    KETUA TRACE 10/10/2022 07:09 PM    UROBILINOGEN 1.0 10/10/2022 07:09 PM    BILIRUBINUR MODERATE 10/10/2022 07:09 PM       Lab Results   Component Value Date/Time    OSMOU 111 10/10/2022 07:09 PM       No components found for: URIC    No results found for: LIPIDPAN    Assessment and Plans:    Hyponatremia  with a urine Na+<20 suggests a component of decreased effective renal perfusion. Component sec to Chronic Liver Disease  S/p lasix and NaCl 10/10  Na 122 on 10/10-->133 today   S/p sodium chloride 2 g oral 3 times daily  PLAN:  1. Monitor labs    2. Anemia and thrombocytopenia  Likely in setting of cirrhosis  Hemoglobin 7 and platelets 04B on 79/26--> hgb 7.1 and platelets 420F on 54/77  B12, folate- WNL SPEP no monoclonal protein  Fe++ sat 4%, iron 13  S/p Ferrlecit  S/p colonoscopy and EGD 10/14  PLAN:  1. Transfuse for hemoglobin<7  2. Monitor H&H    3. Proteinuria possible UTI, possible reflects the acute inflammatory status or the cirrhosis  Microalb: Cr 42.4  Plan:  Follow      4. Essential HTN  BP goal <120/80  BP at goal  PLAN:  1. Follow BP     5. Thigh lesion, right  S/p biopsy and I&D 9/13  Orthopedic surgery previously following    EDWAR Duffy CNP  Patient seen and examined. I had a face to face encounter with the patient. Pt states she feels good. She is awaiting a leg bag and then to be discharged  Agree with exam.    Agree with  formulation, assessment and plan as outlined above and directed by me.    Addition and corrections were done as deemed appropriate.    My exam and plan include:     A/P:  Hyponatremia-Na+ stable above 130-OK for D/C off the NaCl tablets    Continue current treatment as outlined above    AMINA Narvaez MD

## 2022-10-16 NOTE — PROGRESS NOTES
Patient seen doing okay, continue with antibiotics. Continue with metformin. Will follow in office .  Monitor h and h , blood sugars ans sodium

## 2022-10-16 NOTE — PROGRESS NOTES
Paged Dr Sawyer [place] regarding discharge with thigh drain. Dr Ellen Kuhn covering; orders to remove R thigh drain prior to discharge received.

## 2022-10-16 NOTE — PROGRESS NOTES
PROGRESS NOTE        Patient Presents with/Seen in Consultation For      *Reason for Consult: cirrhosis   CHIEF COMPLAINT:  lower leg edema     Subjective:     Patient seen laying in bed, in NAD. Having am labs drawn currently, will order H&H. Tolerating diet. Denies any N/V, or abdominal pain. No BM, +flatus. POC reviewed with the patient, all questions answered. Review of Systems  Aside from what was mentioned in the PMH and HPI, essentially unremarkable, all others negative. Objective:     /61   Pulse 73   Temp 98.4 °F (36.9 °C) (Oral)   Resp 16   Ht 5' 6\" (1.676 m)   Wt 141 lb 3 oz (64 kg)   SpO2 98%   BMI 22.79 kg/m²     General appearance: alert, awake, laying in bed, and cooperative  Eyes: conjunctiva pale, sclera anicteric. PERRL.   Lungs: clear to auscultation bilaterally  Heart: regular rate and rhythm, no murmur, 2+ pulses; no edema  Abdomen: soft, non-tender; bowel sounds normal  Extremities: extremities without edema  Pulses: 2+ and symmetric  Skin: Skin color, texture, turgor normal.   Neurologic: Grossly normal    pantoprazole (PROTONIX) tablet 40 mg, BID AC  metFORMIN (GLUCOPHAGE) tablet 500 mg, BID WC  linezolid (ZYVOX) tablet 600 mg, 2 times per day  vitamin B-6 (PYRIDOXINE) tablet 50 mg, Daily  HYDROcodone-acetaminophen (NORCO) 5-325 MG per tablet 1 tablet, Q4H PRN  [Held by provider] sodium chloride tablet 2 g, TID WC  insulin lispro (HUMALOG) injection vial 0-4 Units, TID WC  insulin lispro (HUMALOG) injection vial 0-4 Units, Nightly       Data Review  CBC:   Lab Results   Component Value Date/Time    WBC 4.4 10/15/2022 10:10 AM    RBC 3.56 10/15/2022 10:10 AM    HGB 7.1 10/16/2022 09:55 AM    HCT 25.7 10/16/2022 09:55 AM    MCV 71.9 10/15/2022 10:10 AM    MCH 19.9 10/15/2022 10:10 AM    MCHC 27.7 10/15/2022 10:10 AM    RDW 18.6 10/15/2022 10:10 AM     10/15/2022 10:10 AM    MPV 10.0 10/15/2022 10:10 AM     CMP:    Lab Results   Component Value Date/Time     10/15/2022 10:10 AM    K 3.6 10/15/2022 10:10 AM    K 4.2 10/10/2022 04:16 PM    CL 98 10/15/2022 10:10 AM    CO2 26 10/15/2022 10:10 AM    BUN 15 10/15/2022 10:10 AM    CREATININE 0.6 10/15/2022 10:10 AM    GFRAA >60 10/15/2022 10:10 AM    LABGLOM >60 10/15/2022 10:10 AM    GLUCOSE 300 10/15/2022 10:10 AM    PROT 7.1 10/15/2022 10:10 AM    LABALBU 2.9 10/15/2022 10:10 AM    CALCIUM 8.6 10/15/2022 10:10 AM    BILITOT 0.7 10/15/2022 10:10 AM    ALKPHOS 85 10/15/2022 10:10 AM    AST 59 10/15/2022 10:10 AM    ALT 28 10/15/2022 10:10 AM     Hepatic Function Panel:    Lab Results   Component Value Date/Time    ALKPHOS 85 10/15/2022 10:10 AM    ALT 28 10/15/2022 10:10 AM    AST 59 10/15/2022 10:10 AM    PROT 7.1 10/15/2022 10:10 AM    BILITOT 0.7 10/15/2022 10:10 AM    LABALBU 2.9 10/15/2022 10:10 AM     No components found for: CHLPL  No results found for: TRIG    Lab Results   Component Value Date    HDL 11 10/12/2022       Lab Results   Component Value Date    LDLCALC 41 10/12/2022       Lab Results   Component Value Date    LABVLDL 23 10/12/2022      PT/INR:    Lab Results   Component Value Date/Time    PROTIME 19.2 10/15/2022 10:10 AM    INR 1.7 10/15/2022 10:10 AM     IRON:    Lab Results   Component Value Date/Time    IRON 13 10/11/2022 11:10 AM     Iron Saturation:  No components found for: PERCENTFE  FERRITIN:    Lab Results   Component Value Date/Time    FERRITIN 25 10/11/2022 11:10 AM         Assessment:     Active Problems:  Liver Cirrhosis  Ascites, minimal on CT  Bilateral lower leg edema   Cholelithiasis   Hyponatremia   Elevated lipase   Anemia, microcytic   Thrombocytopenia   Elevated CEA 10.8  EGD/Colon 10/14/22: SUB OPTIMAL PREP. MULTIPLE AVMS ASCENDING COLON. 2 SMALL ANTRAL ULCERS. Gastritis bx done to rule out H. Pylori.  Duodenum:  DUODENITIS WITH SMALL BULB ULCER   Bx. done to rule out sprue    Plan:     Hepatitis C treatment too follow, Genotype pending  H&H this AM  Diet as tolerated  Continue

## 2022-10-17 LAB
ADDENDUM ELECTROPHORESIS URINE RANDOM: NORMAL
IMMUNOFIXATION URINE: NORMAL

## 2022-10-19 LAB — HEPATITIS C GENOTYPE: NORMAL

## 2022-10-21 LAB
BLOOD CULTURE, ROUTINE: NORMAL
CULTURE, BLOOD 2: NORMAL

## 2022-11-17 ENCOUNTER — NURSE TRIAGE (OUTPATIENT)
Dept: OTHER | Facility: CLINIC | Age: 67
End: 2022-11-17

## 2022-11-17 NOTE — DISCHARGE SUMMARY
[address] [address]                               DISCHARGE SUMMARY    PATIENT NAME: Wally Guzman                          :        1955  MED REC NO:   [id number]                            ROOM:       5245  ACCOUNT NO:   [de-identified]                           ADMIT DATE: 10/10/2022  PROVIDER:     Vasiliy Bennett DO                  [facility] DATE: 10/16/2022    FINAL DIAGNOSES:  1. Right thigh and muscle mass, most likely abscess and infected  hematoma. 2.  Anemia on admission, suspicious for blood loss. 3.  AV malformations of the colon. 4.  Gastroesophageal reflux disorder. 5.  Gastric ulcers. 6.  Liver cirrhosis. 7.  Hepatitis C reactive. 8.  Cholelithiasis. 9.  Hyponatremia. 10.  Elevated CEA level. HISTORY AND HOSPITAL COURSE:  The patient is a 29-year-old white female  and she had this lump on her right thigh and it was painful to touch and  it was rather large and seemed to be firm and immobile and it was quite  concerning for some type of tumor or malignancy. She had an imaging of  his done by MRI and it was a 5-cm cystic lobulated mass within the  muscle. Differential included cystic neoplasm, therefore we had  Orthopedic Surgery see her. In the meantime, she also had CAT scan of  the abdomen, which showed cirrhosis, cholelithiasis, and ascites. She  had a mild elevation of her liver enzymes and bilirubin. Platelets were  only 89,000. Sodium was down to 122. GI was consulted for. She was  found to have cirrhosis, most likely related to hepatitis C, as her  hepatitis C was reactive. GI took her surgery, performed EGD and  colonoscopy. EGD showed gastric ulcers and GERD and colonoscopy showed  multiple AV malformations of the colon. She was treated with Protonix.    Interventional Radiology drained the abscess and it was purulent and it  grew out Staph and she was treated with linezolid extensively per  Infectious Disease consult. She will continue as an outpatient to  receive medication for same. She was having some issues with her sugar  and was treated with sliding scale. We added metformin and Jardiance. She seemed to be tolerating this okay and her blood sugars are improved. Her sodium again was low, but with some fluid therapy, discontinuation  of her diuretics, and treatment with sodium chloride tablets, her sodium  improved. It was likely related to her cirrhosis and she seems to be  making a real nice recovery. We are going to discharge her. She is  going to go home on metformin 500 mg b.i.d., iron 325 mg b.i.d.,  Protonix 40 b.i.d., Jardiance 10 daily, B6 vitamins 50 mg daily, Parafon  Forte 500 three times a day, and lisinopril 10 daily. We are going to  follow her up in the office to check the status of her abscess and she  is also going to follow up with GI for further treatment of her  hepatitis. She will need to have her blood work monitored and any  change in her physical condition should be reported to me. I look  forward to seeing her as an outpatient. We will discharge her home. At  the time of discharge, her condition is improved. Her prognosis is  guarded.         Yajaira Lucas DO    D: 11/16/2022 16:20:32       T: 11/16/2022 16:24:40     [id number]  Job#: [id number]     Doc#: [id number]    CC:

## 2022-11-18 ENCOUNTER — HOSPITAL ENCOUNTER (EMERGENCY)
Age: 67
Discharge: HOME | End: 2022-11-18
Attending: EMERGENCY MEDICINE
Payer: MEDICARE

## 2022-11-18 ENCOUNTER — APPOINTMENT (OUTPATIENT)
Dept: CT IMAGING | Age: 67
End: 2022-11-18
Payer: MEDICARE

## 2022-11-18 VITALS
HEART RATE: 80 BPM | RESPIRATION RATE: 16 BRPM | BODY MASS INDEX: 21.69 KG/M2 | WEIGHT: 135 LBS | SYSTOLIC BLOOD PRESSURE: 146 MMHG | HEIGHT: 66 IN | OXYGEN SATURATION: 98 % | TEMPERATURE: 98 F | DIASTOLIC BLOOD PRESSURE: 76 MMHG

## 2022-11-18 DIAGNOSIS — R18.8 OTHER ASCITES: Primary | ICD-10-CM

## 2022-11-18 LAB
ALBUMIN SERPL-MCNC: 3.4 G/DL (ref 3.5–5.2)
ALP BLD-CCNC: 116 U/L (ref 35–104)
ALT SERPL-CCNC: 39 U/L (ref 0–32)
ANION GAP SERPL CALCULATED.3IONS-SCNC: 10 MMOL/L (ref 7–16)
AST SERPL-CCNC: 51 U/L (ref 0–31)
BILIRUB SERPL-MCNC: 1 MG/DL (ref 0–1.2)
BUN BLDV-MCNC: 12 MG/DL (ref 6–23)
CALCIUM SERPL-MCNC: 8.7 MG/DL (ref 8.6–10.2)
CHLORIDE BLD-SCNC: 102 MMOL/L (ref 98–107)
CO2: 24 MMOL/L (ref 22–29)
CREAT SERPL-MCNC: 0.5 MG/DL (ref 0.5–1)
GFR SERPL CREATININE-BSD FRML MDRD: >60 ML/MIN/1.73
GLUCOSE BLD-MCNC: 258 MG/DL (ref 74–99)
HCT VFR BLD CALC: 28.8 % (ref 34–48)
HEMOGLOBIN: 8.1 G/DL (ref 11.5–15.5)
MCH RBC QN AUTO: 22 PG (ref 26–35)
MCHC RBC AUTO-ENTMCNC: 28.1 % (ref 32–34.5)
MCV RBC AUTO: 78.3 FL (ref 80–99.9)
PDW BLD-RTO: 26.5 FL (ref 11.5–15)
PLATELET # BLD: 102 E9/L (ref 130–450)
PMV BLD AUTO: ABNORMAL FL (ref 7–12)
POTASSIUM REFLEX MAGNESIUM: 3.8 MMOL/L (ref 3.5–5)
RBC # BLD: 3.68 E12/L (ref 3.5–5.5)
SODIUM BLD-SCNC: 136 MMOL/L (ref 132–146)
TOTAL PROTEIN: 6.8 G/DL (ref 6.4–8.3)
WBC # BLD: 2.8 E9/L (ref 4.5–11.5)

## 2022-11-18 PROCEDURE — 99284 EMERGENCY DEPT VISIT MOD MDM: CPT

## 2022-11-18 PROCEDURE — 74176 CT ABD & PELVIS W/O CONTRAST: CPT

## 2022-11-18 PROCEDURE — 80053 COMPREHEN METABOLIC PANEL: CPT

## 2022-11-18 PROCEDURE — 85027 COMPLETE CBC AUTOMATED: CPT

## 2022-11-18 ASSESSMENT — PAIN - FUNCTIONAL ASSESSMENT
PAIN_FUNCTIONAL_ASSESSMENT: NONE - DENIES PAIN

## 2022-11-18 NOTE — ED PROVIDER NOTES
HPI:  11/18/22,   Time: 12:20 PM RAJAT Ramsey is a 79 y.o. female presenting to the ED for evaluation of a distended abdomen. She states she feels bloated. She has no pain. She has a normal appetite. She was in the hospital proximally 1 month ago for treatment of an infection in her leg. She was told at that time that she had contracted hepatitis C. She has no history of IV drug use, blood transfusions, tattoos or any other risk factors. Patient is had no fevers or chills. .  Patient denies headache, sore throat, neck pain, back pain, chest pain, shortness of breath, cough, hemoptysis, extremity numbness or weakness or rash. ROS:   Pertinent positives and negatives are stated within HPI, all other systems reviewed and are negative.  --------------------------------------------- PAST HISTORY ---------------------------------------------  Past Medical History:  has a past medical history of Hypertension. Past Surgical History:  has a past surgical history that includes Upper gastrointestinal endoscopy (N/A, 10/14/2022) and Colonoscopy (N/A, 10/14/2022). Social History:  reports that she quit smoking about 15 years ago. Her smoking use included cigarettes. She has never used smokeless tobacco. She reports that she does not drink alcohol and does not use drugs. Family History: family history is not on file. The patients home medications have been reviewed. Allergies: Patient has no known allergies.     -------------------------------------------------- RESULTS -------------------------------------------------  All laboratory and radiology results have been personally reviewed by myself   LABS:  Results for orders placed or performed during the hospital encounter of 11/18/22   Comprehensive Metabolic Panel w/ Reflex to MG   Result Value Ref Range    Sodium 136 132 - 146 mmol/L    Potassium reflex Magnesium 3.8 3.5 - 5.0 mmol/L    Chloride 102 98 - 107 mmol/L    CO2 24 22 - 29 mmol/L Anion Gap 10 7 - 16 mmol/L    Glucose 258 (H) 74 - 99 mg/dL    BUN 12 6 - 23 mg/dL    Creatinine 0.5 0.5 - 1.0 mg/dL    Est, Glom Filt Rate >60 >=60 mL/min/1.73    Calcium 8.7 8.6 - 10.2 mg/dL    Total Protein 6.8 6.4 - 8.3 g/dL    Albumin 3.4 (L) 3.5 - 5.2 g/dL    Total Bilirubin 1.0 0.0 - 1.2 mg/dL    Alkaline Phosphatase 116 (H) 35 - 104 U/L    ALT 39 (H) 0 - 32 U/L    AST 51 (H) 0 - 31 U/L   CBC   Result Value Ref Range    WBC 2.8 (L) 4.5 - 11.5 E9/L    RBC 3.68 3.50 - 5.50 E12/L    Hemoglobin 8.1 (L) 11.5 - 15.5 g/dL    Hematocrit 28.8 (L) 34.0 - 48.0 %    MCV 78.3 (L) 80.0 - 99.9 fL    MCH 22.0 (L) 26.0 - 35.0 pg    MCHC 28.1 (L) 32.0 - 34.5 %    RDW 26.5 (H) 11.5 - 15.0 fL    Platelets 944 (L) 290 - 450 E9/L    MPV NOT CALC 7.0 - 12.0 fL       RADIOLOGY:  Interpreted by Radiologist.  CT ABDOMEN PELVIS WO CONTRAST Additional Contrast? None   Final Result   1. Moderate to large amount of ascites with increased volume compared to   10/10/2022.   2. Nodular contour of the liver likely secondary to cirrhosis. 3. Splenomegaly is likely associated with portal venous hypertension. 4. Cholelithiasis without evidence of acute cholecystitis. 5. Hiatal hernia. 6. Trace left pleural effusion.             ------------------------- NURSING NOTES AND VITALS REVIEWED ---------------------------   The nursing notes within the ED encounter and vital signs as below have been reviewed. BP (!) 140/78   Pulse 68   Temp 98 °F (36.7 °C) (Tympanic)   Resp 16   Ht 5' 6\" (1.676 m)   Wt 135 lb (61.2 kg)   SpO2 100%   BMI 21.79 kg/m²   Oxygen Saturation Interpretation: Normal      ---------------------------------------------------PHYSICAL EXAM--------------------------------------      Constitutional/General: Alert and oriented x3, well appearing, non toxic in NAD  Head: NC/AT  Eyes: PERRL, EOMI  Nose:  Nares patent. No discharge or bleeding  Ears:  TM's intact without erythema or perforation.   External canal without swelling  Mouth: Oropharynx clear, handling secretions, no trismus  Neck: Supple, full ROM, no meningeal signs  Pulmonary: Lungs clear to auscultation bilaterally, no wheezes, rales, or rhonchi. Not in respiratory distress  Cardiovascular:  Regular rate and rhythm, no murmurs, gallops, or rubs. 2+ symmetric distal pulses   Chest:  No tenderness, deformity or crepitus  Abdomen: Mild distention diffusely throughout the abdomen. No tenderness to palpation. Normal bowel sounds are present. Back:  No tenderness to palpation on the cervical or thoracic or lumbar spine  Extremities: Moves all extremities x 4. Warm and well perfused  Skin: warm and dry without rash  Neurologic: GCS 15, no focal acute neurological deficit  Psych: Normal Affect      ------------------------------ ED COURSE/MEDICAL DECISION MAKING----------------------  Medications - No data to display      Medical Decision Making:    Patient's laboratory suggest chronic liver disease and the bloating is caused by ascites as suspected. She will be discharged home. She has no difficulty breathing. She was advised to return should she develop abdominal pain or difficulty breathing from the swelling. Advised to follow-up with her primary care physician to establish reassessment and possible paracentesis when necessary. Patient advised to contact their primary care physician either today or within the next 24 hours to secure a follow-up appointment and to return to the emergency Department immediately should their symptoms recur or worsen. --------------------------------- IMPRESSION AND DISPOSITION ---------------------------------    IMPRESSION  1.  Other ascites        DISPOSITION  Disposition: Discharge to home  Patient condition is good          Maribel Acosta DO  11/18/22 1402

## 2022-12-12 ENCOUNTER — HOSPITAL ENCOUNTER (OUTPATIENT)
Age: 67
Discharge: HOME | End: 2022-12-12
Payer: MEDICARE

## 2022-12-12 LAB
ALBUMIN SERPL-MCNC: 3.6 G/DL (ref 3.5–5.2)
ALP BLD-CCNC: 87 U/L (ref 35–104)
ALT SERPL-CCNC: 24 U/L (ref 0–32)
ANION GAP SERPL CALCULATED.3IONS-SCNC: 7 MMOL/L (ref 7–16)
APTT: 34.3 SEC (ref 24.5–35.1)
AST SERPL-CCNC: 46 U/L (ref 0–31)
BASOPHILS ABSOLUTE: 0.02 E9/L (ref 0–0.2)
BASOPHILS RELATIVE PERCENT: 0.9 % (ref 0–2)
BILIRUB SERPL-MCNC: 0.9 MG/DL (ref 0–1.2)
BUN BLDV-MCNC: 11 MG/DL (ref 6–23)
CALCIUM SERPL-MCNC: 10.6 MG/DL (ref 8.6–10.2)
CHLORIDE BLD-SCNC: 99 MMOL/L (ref 98–107)
CO2: 32 MMOL/L (ref 22–29)
CREAT SERPL-MCNC: 0.7 MG/DL (ref 0.5–1)
EOSINOPHILS ABSOLUTE: 0.03 E9/L (ref 0.05–0.5)
EOSINOPHILS RELATIVE PERCENT: 1.4 % (ref 0–6)
FERRITIN: 22 NG/ML
GFR SERPL CREATININE-BSD FRML MDRD: >60 ML/MIN/1.73
GLUCOSE BLD-MCNC: 200 MG/DL (ref 74–99)
HCT VFR BLD CALC: 30.3 % (ref 34–48)
HEMOGLOBIN: 8.8 G/DL (ref 11.5–15.5)
IMMATURE GRANULOCYTES #: 0.01 E9/L
IMMATURE GRANULOCYTES %: 0.5 % (ref 0–5)
INR BLD: 1.4
IRON SATURATION: 8 % (ref 15–50)
IRON: 34 MCG/DL (ref 37–145)
LYMPHOCYTES ABSOLUTE: 0.74 E9/L (ref 1.5–4)
LYMPHOCYTES RELATIVE PERCENT: 34.9 % (ref 20–42)
MCH RBC QN AUTO: 21.8 PG (ref 26–35)
MCHC RBC AUTO-ENTMCNC: 29 % (ref 32–34.5)
MCV RBC AUTO: 75.2 FL (ref 80–99.9)
MONOCYTES ABSOLUTE: 0.2 E9/L (ref 0.1–0.95)
MONOCYTES RELATIVE PERCENT: 9.4 % (ref 2–12)
NEUTROPHILS ABSOLUTE: 1.12 E9/L (ref 1.8–7.3)
NEUTROPHILS RELATIVE PERCENT: 52.9 % (ref 43–80)
PDW BLD-RTO: 19 FL (ref 11.5–15)
PLATELET # BLD: 88 E9/L (ref 130–450)
PLATELET CONFIRMATION: NORMAL
PMV BLD AUTO: ABNORMAL FL (ref 7–12)
POTASSIUM SERPL-SCNC: 3.8 MMOL/L (ref 3.5–5)
PROTHROMBIN TIME: 15.9 SEC (ref 9.3–12.4)
RBC # BLD: 4.03 E12/L (ref 3.5–5.5)
SODIUM BLD-SCNC: 138 MMOL/L (ref 132–146)
TOTAL IRON BINDING CAPACITY: 417 MCG/DL (ref 250–450)
TOTAL PROTEIN: 7.9 G/DL (ref 6.4–8.3)
WBC # BLD: 2.1 E9/L (ref 4.5–11.5)

## 2022-12-12 PROCEDURE — 85025 COMPLETE CBC W/AUTO DIFF WBC: CPT

## 2022-12-12 PROCEDURE — 83540 ASSAY OF IRON: CPT

## 2022-12-12 PROCEDURE — 80053 COMPREHEN METABOLIC PANEL: CPT

## 2022-12-12 PROCEDURE — 82728 ASSAY OF FERRITIN: CPT

## 2022-12-12 PROCEDURE — 85730 THROMBOPLASTIN TIME PARTIAL: CPT

## 2022-12-12 PROCEDURE — 36415 COLL VENOUS BLD VENIPUNCTURE: CPT

## 2022-12-12 PROCEDURE — 85610 PROTHROMBIN TIME: CPT

## 2022-12-12 PROCEDURE — 83550 IRON BINDING TEST: CPT

## 2022-12-22 ENCOUNTER — HOSPITAL ENCOUNTER (OUTPATIENT)
Dept: INTERVENTIONAL RADIOLOGY/VASCULAR | Age: 67
Discharge: HOME | End: 2022-12-24
Payer: MEDICARE

## 2022-12-22 VITALS
WEIGHT: 127 LBS | BODY MASS INDEX: 20.41 KG/M2 | TEMPERATURE: 98.8 F | SYSTOLIC BLOOD PRESSURE: 141 MMHG | HEIGHT: 66 IN | RESPIRATION RATE: 16 BRPM | HEART RATE: 88 BPM | DIASTOLIC BLOOD PRESSURE: 71 MMHG | OXYGEN SATURATION: 98 %

## 2022-12-22 DIAGNOSIS — K74.60 CIRRHOSIS OF LIVER WITH ASCITES, UNSPECIFIED HEPATIC CIRRHOSIS TYPE (HCC): ICD-10-CM

## 2022-12-22 DIAGNOSIS — R18.8 CIRRHOSIS OF LIVER WITH ASCITES, UNSPECIFIED HEPATIC CIRRHOSIS TYPE (HCC): ICD-10-CM

## 2022-12-22 LAB
ALBUMIN FLUID: 1.4 G/DL
AMYLASE FLUID: 26 U/L
APPEARANCE FLUID: CLEAR
CELL COUNT FLUID TYPE: NORMAL
COLOR FLUID: YELLOW
FLUID TYPE: NORMAL
MONOCYTE, FLUID: 100 %
NEUTROPHIL, FLUID: 1 %
NUCLEATED CELLS FLUID: 419 /UL
PROTEIN FLUID: 2.6 G/DL
RBC FLUID: 3000 /UL

## 2022-12-22 PROCEDURE — C1729 CATH, DRAINAGE: HCPCS

## 2022-12-22 PROCEDURE — 84157 ASSAY OF PROTEIN OTHER: CPT

## 2022-12-22 PROCEDURE — 87070 CULTURE OTHR SPECIMN AEROBIC: CPT

## 2022-12-22 PROCEDURE — 87205 SMEAR GRAM STAIN: CPT

## 2022-12-22 PROCEDURE — 49083 ABD PARACENTESIS W/IMAGING: CPT

## 2022-12-22 PROCEDURE — 82150 ASSAY OF AMYLASE: CPT

## 2022-12-22 PROCEDURE — 82042 OTHER SOURCE ALBUMIN QUAN EA: CPT

## 2022-12-22 PROCEDURE — 2500000003 HC RX 250 WO HCPCS: Performed by: RADIOLOGY

## 2022-12-22 PROCEDURE — 89051 BODY FLUID CELL COUNT: CPT

## 2022-12-22 RX ORDER — LIDOCAINE HYDROCHLORIDE 20 MG/ML
INJECTION, SOLUTION INFILTRATION; PERINEURAL
Status: COMPLETED | OUTPATIENT
Start: 2022-12-22 | End: 2022-12-22

## 2022-12-22 RX ADMIN — LIDOCAINE HYDROCHLORIDE 10 ML: 20 INJECTION, SOLUTION INFILTRATION; PERINEURAL at 11:38

## 2022-12-22 NOTE — PROGRESS NOTES
Patient arrived via ambulation with  to Radiology department for paracentesis. Allergies, home medications, H&P and fasting instructions reviewed with patient. Vital signs taken. Procedural instructions given, questions answered, understanding expressed and consent signed.

## 2022-12-22 NOTE — DISCHARGE INSTRUCTIONS
Learning About Paracentesis  What is paracentesis? Paracentesis (say \"lxhr-tl-miz-UMBERTO-chris\") is a procedure that removes fluid from the belly. The buildup of fluid may be caused by infection, inflammation, an injury, or other problems. Swelling from too much fluid may cause pain or trouble breathing. The doctor will remove the extra fluid with a needle attached to a tube. Why is paracentesis done? Paracentesis may be done to:  Find the cause of fluid buildup in the belly. Diagnose an infection in the peritoneal fluid. Check for certain types of cancer. Remove a large amount of fluid that is causing pain or trouble breathing or that is affecting how the kidneys or the intestines (bowel) are working. Check for damage after a belly injury. How is the procedure done? You will empty your bladder before the procedure. Your doctor or nurse will clean the area of your belly where the needle will go in. Then he or she will put sterile towels around the area. You may get a shot of numbing medicine in the skin of your belly. Then your doctor will gently insert a needle where the fluid is. He or she may attach a tube (catheter) to the site to help collect the fluid. After the fluid has drained, your doctor will take out the needle or catheter and put a bandage on the site. How long does a paracentesis take? The procedure may take from a few minutes to 30 minutes or more. What happens after the test?  You can do your normal activities after the procedure, unless your doctor tells you not to. After the procedure, you may have some clear fluid draining from the site, especially if a large amount of fluid was taken out. There will be less drainage in 1 to 2 days. Ask your doctor how much drainage to expect. A small gauze pad and bandage may be needed. You may need to change the bandage. If your doctor thinks that testing the fluid can help find the cause of a problem, he or she will send it to a lab.   If fluid builds up in your belly again, your doctor may repeat this procedure. When should you call for help? Call 911 anytime you think you may need emergency care. For example, call if:  You passed out (lost consciousness). Call your doctor now or seek immediate medical care if:  You have symptoms of infection, such as: Increased pain, swelling, warmth, or redness. Red streaks or pus. A fever. You are dizzy or lightheaded, or you feel like you may faint. You have new or worse belly pain. Watch closely for changes in your health, and be sure to contact your doctor if:  Fluid builds up in your belly again. You do not get better as expected. Where can you learn more? Go to http://www.woods.com/ and enter X447 to learn more about \"Learning About Paracentesis. \"  Current as of: June 6, 2022               Content Version: 13.5  © 2006-2022 [employer]. Care instructions adapted under license by [facility] ([facility]). If you have questions about a medical condition or this instruction, always ask your healthcare professional. [place] [address] any warranty or liability for your use of this information.

## 2022-12-22 NOTE — OR NURSING
Pt walked into IR room for paracentesis. Pt is alert and oriented, denies any pain prior to procedure. Ultrasound images taken prior to procedure. Procedure is explained to patient, including possible risks. Pt verbalizes understanding and consent form signed. Procedure done under sterile technique and guidance of ultrasound imaging. 1% Lidocaine is used during procedure for comfort measures. A total of 1100 ml's of loly colored ascitic fluid drained during procedure. Catheter removed and op-site dressing applied to site with no bleeding noted. Specimen collected and sent to lab for ordered testing. Pt tolerated procedure well and denies any pain after. Pt given discharge instructions and pt verbalizes understanding of post procedure care/follow up. Pt walked out of department with no difficulties.

## 2022-12-23 LAB — GRAM STAIN ORDERABLE: NORMAL

## 2022-12-25 LAB
BODY FLUID CULTURE, STERILE: NORMAL
GRAM STAIN RESULT: NORMAL

## 2023-01-27 ENCOUNTER — HOSPITAL ENCOUNTER (OUTPATIENT)
Age: 68
Discharge: HOME | End: 2023-01-27
Payer: MEDICARE

## 2023-01-27 LAB
ALBUMIN SERPL-MCNC: 3.9 G/DL (ref 3.5–5.2)
ALP BLD-CCNC: 91 U/L (ref 35–104)
ALT SERPL-CCNC: 66 U/L (ref 0–32)
ANION GAP SERPL CALCULATED.3IONS-SCNC: 11 MMOL/L (ref 7–16)
ANISOCYTOSIS: ABNORMAL
APTT: 37.7 SEC (ref 24.5–35.1)
AST SERPL-CCNC: 70 U/L (ref 0–31)
BASOPHILS ABSOLUTE: 0.02 E9/L (ref 0–0.2)
BASOPHILS RELATIVE PERCENT: 0.8 % (ref 0–2)
BILIRUB SERPL-MCNC: 1 MG/DL (ref 0–1.2)
BUN BLDV-MCNC: 27 MG/DL (ref 6–23)
CALCIUM SERPL-MCNC: 9.9 MG/DL (ref 8.6–10.2)
CHLORIDE BLD-SCNC: 95 MMOL/L (ref 98–107)
CO2: 24 MMOL/L (ref 22–29)
CREAT SERPL-MCNC: 0.9 MG/DL (ref 0.5–1)
EOSINOPHILS ABSOLUTE: 0.08 E9/L (ref 0.05–0.5)
EOSINOPHILS RELATIVE PERCENT: 3.3 % (ref 0–6)
FERRITIN: 18 NG/ML
GFR SERPL CREATININE-BSD FRML MDRD: >60 ML/MIN/1.73
GLUCOSE BLD-MCNC: 374 MG/DL (ref 74–99)
HCT VFR BLD CALC: 31.3 % (ref 34–48)
HEMOGLOBIN: 9 G/DL (ref 11.5–15.5)
HYPOCHROMIA: ABNORMAL
IMMATURE GRANULOCYTES #: 0.01 E9/L
IMMATURE GRANULOCYTES %: 0.4 % (ref 0–5)
INR BLD: 1.4
IRON SATURATION: 5 % (ref 15–50)
IRON: 25 MCG/DL (ref 37–145)
LYMPHOCYTES ABSOLUTE: 0.88 E9/L (ref 1.5–4)
LYMPHOCYTES RELATIVE PERCENT: 36.2 % (ref 20–42)
MCH RBC QN AUTO: 19.7 PG (ref 26–35)
MCHC RBC AUTO-ENTMCNC: 28.8 % (ref 32–34.5)
MCV RBC AUTO: 68.3 FL (ref 80–99.9)
MONOCYTES ABSOLUTE: 0.16 E9/L (ref 0.1–0.95)
MONOCYTES RELATIVE PERCENT: 6.6 % (ref 2–12)
NEUTROPHILS ABSOLUTE: 1.28 E9/L (ref 1.8–7.3)
NEUTROPHILS RELATIVE PERCENT: 52.7 % (ref 43–80)
OVALOCYTES: ABNORMAL
PDW BLD-RTO: 17.2 FL (ref 11.5–15)
PLATELET # BLD: 63 E9/L (ref 130–450)
PLATELET CONFIRMATION: NORMAL
PMV BLD AUTO: ABNORMAL FL (ref 7–12)
POIKILOCYTES: ABNORMAL
POTASSIUM SERPL-SCNC: 4.7 MMOL/L (ref 3.5–5)
PROTHROMBIN TIME: 15.7 SEC (ref 9.3–12.4)
RBC # BLD: 4.58 E12/L (ref 3.5–5.5)
SODIUM BLD-SCNC: 130 MMOL/L (ref 132–146)
TOTAL IRON BINDING CAPACITY: 478 MCG/DL (ref 250–450)
TOTAL PROTEIN: 8.5 G/DL (ref 6.4–8.3)
WBC # BLD: 2.4 E9/L (ref 4.5–11.5)

## 2023-01-27 PROCEDURE — 83540 ASSAY OF IRON: CPT

## 2023-01-27 PROCEDURE — 82728 ASSAY OF FERRITIN: CPT

## 2023-01-27 PROCEDURE — 80053 COMPREHEN METABOLIC PANEL: CPT

## 2023-01-27 PROCEDURE — 85730 THROMBOPLASTIN TIME PARTIAL: CPT

## 2023-01-27 PROCEDURE — 83550 IRON BINDING TEST: CPT

## 2023-01-27 PROCEDURE — 36415 COLL VENOUS BLD VENIPUNCTURE: CPT

## 2023-01-27 PROCEDURE — 85610 PROTHROMBIN TIME: CPT

## 2023-01-27 PROCEDURE — 82105 ALPHA-FETOPROTEIN SERUM: CPT

## 2023-01-27 PROCEDURE — 85025 COMPLETE CBC W/AUTO DIFF WBC: CPT

## 2023-01-29 LAB — AFP-TUMOR MARKER: 7 NG/ML (ref 0–9)

## 2023-02-28 ENCOUNTER — HOSPITAL ENCOUNTER (OUTPATIENT)
Age: 68
Discharge: HOME | End: 2023-02-28
Payer: MEDICARE

## 2023-02-28 LAB
ALBUMIN SERPL-MCNC: 4 G/DL (ref 3.5–5.2)
ALP BLD-CCNC: 82 U/L (ref 35–104)
ALT SERPL-CCNC: 29 U/L (ref 0–32)
ANION GAP SERPL CALCULATED.3IONS-SCNC: 11 MMOL/L (ref 7–16)
ANISOCYTOSIS: ABNORMAL
APTT: 34.6 SEC (ref 24.5–35.1)
AST SERPL-CCNC: 33 U/L (ref 0–31)
BASOPHILS ABSOLUTE: 0.02 E9/L (ref 0–0.2)
BASOPHILS RELATIVE PERCENT: 0.8 % (ref 0–2)
BILIRUB SERPL-MCNC: 1.6 MG/DL (ref 0–1.2)
BUN BLDV-MCNC: 32 MG/DL (ref 6–23)
CALCIUM SERPL-MCNC: 9.8 MG/DL (ref 8.6–10.2)
CHLORIDE BLD-SCNC: 92 MMOL/L (ref 98–107)
CO2: 25 MMOL/L (ref 22–29)
CREAT SERPL-MCNC: 1.1 MG/DL (ref 0.5–1)
EOSINOPHILS ABSOLUTE: 0.01 E9/L (ref 0.05–0.5)
EOSINOPHILS RELATIVE PERCENT: 0.4 % (ref 0–6)
FERRITIN: 13 NG/ML
GFR SERPL CREATININE-BSD FRML MDRD: 55 ML/MIN/1.73
GLUCOSE BLD-MCNC: 481 MG/DL (ref 74–99)
HCT VFR BLD CALC: 29.7 % (ref 34–48)
HEMOGLOBIN: 8.4 G/DL (ref 11.5–15.5)
HYPOCHROMIA: ABNORMAL
IMMATURE GRANULOCYTES #: 0.01 E9/L
IMMATURE GRANULOCYTES %: 0.4 % (ref 0–5)
INR BLD: 1.4
IRON SATURATION: 5 % (ref 15–50)
IRON: 25 MCG/DL (ref 37–145)
LYMPHOCYTES ABSOLUTE: 0.65 E9/L (ref 1.5–4)
LYMPHOCYTES RELATIVE PERCENT: 25.5 % (ref 20–42)
MCH RBC QN AUTO: 18.9 PG (ref 26–35)
MCHC RBC AUTO-ENTMCNC: 28.3 % (ref 32–34.5)
MCV RBC AUTO: 66.7 FL (ref 80–99.9)
MONOCYTES ABSOLUTE: 0.18 E9/L (ref 0.1–0.95)
MONOCYTES RELATIVE PERCENT: 7.1 % (ref 2–12)
NEUTROPHILS ABSOLUTE: 1.68 E9/L (ref 1.8–7.3)
NEUTROPHILS RELATIVE PERCENT: 65.8 % (ref 43–80)
PDW BLD-RTO: 18.2 FL (ref 11.5–15)
PLATELET # BLD: 72 E9/L (ref 130–450)
PLATELET CONFIRMATION: NORMAL
PMV BLD AUTO: ABNORMAL FL (ref 7–12)
POTASSIUM SERPL-SCNC: 4.5 MMOL/L (ref 3.5–5)
PROTHROMBIN TIME: 15.2 SEC (ref 9.3–12.4)
RBC # BLD: 4.45 E12/L (ref 3.5–5.5)
SODIUM BLD-SCNC: 128 MMOL/L (ref 132–146)
TOTAL IRON BINDING CAPACITY: 473 MCG/DL (ref 250–450)
TOTAL PROTEIN: 8.6 G/DL (ref 6.4–8.3)
WBC # BLD: 2.6 E9/L (ref 4.5–11.5)

## 2023-02-28 PROCEDURE — 85025 COMPLETE CBC W/AUTO DIFF WBC: CPT

## 2023-02-28 PROCEDURE — 36415 COLL VENOUS BLD VENIPUNCTURE: CPT

## 2023-02-28 PROCEDURE — 83540 ASSAY OF IRON: CPT

## 2023-02-28 PROCEDURE — 85730 THROMBOPLASTIN TIME PARTIAL: CPT

## 2023-02-28 PROCEDURE — 83550 IRON BINDING TEST: CPT

## 2023-02-28 PROCEDURE — 82728 ASSAY OF FERRITIN: CPT

## 2023-02-28 PROCEDURE — 80053 COMPREHEN METABOLIC PANEL: CPT

## 2023-02-28 PROCEDURE — 85610 PROTHROMBIN TIME: CPT

## 2023-04-17 ENCOUNTER — HOSPITAL ENCOUNTER (OUTPATIENT)
Age: 68
Discharge: HOME | End: 2023-04-17
Payer: MEDICARE

## 2023-04-17 LAB
ALBUMIN SERPL-MCNC: 3.9 G/DL (ref 3.5–5.2)
ALP SERPL-CCNC: 93 U/L (ref 35–104)
ALT SERPL-CCNC: 39 U/L (ref 0–32)
ANION GAP SERPL CALCULATED.3IONS-SCNC: 11 MMOL/L (ref 7–16)
ANISOCYTOSIS: ABNORMAL
APTT BLD: 36.3 SEC (ref 24.5–35.1)
AST SERPL-CCNC: 49 U/L (ref 0–31)
BASOPHILS # BLD: 0.01 E9/L (ref 0–0.2)
BASOPHILS NFR BLD: 0.5 % (ref 0–2)
BILIRUB SERPL-MCNC: 0.9 MG/DL (ref 0–1.2)
BUN SERPL-MCNC: 34 MG/DL (ref 6–23)
CALCIUM SERPL-MCNC: 9 MG/DL (ref 8.6–10.2)
CHLORIDE SERPL-SCNC: 107 MMOL/L (ref 98–107)
CO2 SERPL-SCNC: 21 MMOL/L (ref 22–29)
CREAT SERPL-MCNC: 0.8 MG/DL (ref 0.5–1)
EOSINOPHIL # BLD: 0.03 E9/L (ref 0.05–0.5)
EOSINOPHIL NFR BLD: 1.5 % (ref 0–6)
ERYTHROCYTE [DISTWIDTH] IN BLOOD BY AUTOMATED COUNT: ABNORMAL FL (ref 11.5–15)
GLUCOSE SERPL-MCNC: 162 MG/DL (ref 74–99)
HCT VFR BLD AUTO: 32.1 % (ref 34–48)
HGB BLD-MCNC: 9.9 G/DL (ref 11.5–15.5)
IMM GRANULOCYTES # BLD: 0.01 E9/L
IMM GRANULOCYTES NFR BLD: 0.5 % (ref 0–5)
INR BLD: 1.3
LYMPHOCYTES # BLD: 0.71 E9/L (ref 1.5–4)
LYMPHOCYTES NFR BLD: 36.6 % (ref 20–42)
MCH RBC QN AUTO: 26 PG (ref 26–35)
MCHC RBC AUTO-ENTMCNC: 30.8 % (ref 32–34.5)
MCV RBC AUTO: 84.3 FL (ref 80–99.9)
MONOCYTES # BLD: 0.16 E9/L (ref 0.1–0.95)
MONOCYTES NFR BLD: 8.2 % (ref 2–12)
NEUTROPHILS # BLD: 1.02 E9/L (ref 1.8–7.3)
NEUTS SEG NFR BLD: 52.7 % (ref 43–80)
OVALOCYTES: ABNORMAL
PLATELET # BLD AUTO: 53 E9/L (ref 130–450)
PLATELET CONFIRMATION: NORMAL
PMV BLD AUTO: ABNORMAL FL (ref 7–12)
POLYCHROMASIA: ABNORMAL
POTASSIUM SERPL-SCNC: 3.7 MMOL/L (ref 3.5–5)
PROT SERPL-MCNC: 7.4 G/DL (ref 6.4–8.3)
PROTHROMBIN TIME: 14.2 SEC (ref 9.3–12.4)
RBC # BLD AUTO: 3.81 E12/L (ref 3.5–5.5)
SCHISTOCYTES: ABNORMAL
SODIUM SERPL-SCNC: 139 MMOL/L (ref 132–146)
WBC # BLD: 1.9 E9/L (ref 4.5–11.5)

## 2023-04-17 PROCEDURE — 80053 COMPREHEN METABOLIC PANEL: CPT

## 2023-04-17 PROCEDURE — 36415 COLL VENOUS BLD VENIPUNCTURE: CPT

## 2023-04-17 PROCEDURE — 85025 COMPLETE CBC W/AUTO DIFF WBC: CPT

## 2023-04-17 PROCEDURE — 85610 PROTHROMBIN TIME: CPT

## 2023-04-17 PROCEDURE — 85730 THROMBOPLASTIN TIME PARTIAL: CPT

## 2023-07-17 ENCOUNTER — HOSPITAL ENCOUNTER (OUTPATIENT)
Age: 68
Discharge: HOME | End: 2023-07-17
Payer: MEDICARE

## 2023-07-17 LAB
ALBUMIN SERPL-MCNC: 4.4 G/DL (ref 3.5–5.2)
ALP SERPL-CCNC: 101 U/L (ref 35–104)
ALT SERPL-CCNC: 52 U/L (ref 0–32)
ANION GAP SERPL CALCULATED.3IONS-SCNC: 10 MMOL/L (ref 7–16)
AST SERPL-CCNC: 49 U/L (ref 0–31)
BASOPHILS # BLD: 0.02 K/UL (ref 0–0.2)
BASOPHILS NFR BLD: 1 % (ref 0–2)
BILIRUB SERPL-MCNC: 1.4 MG/DL (ref 0–1.2)
BUN SERPL-MCNC: 21 MG/DL (ref 6–23)
CALCIUM SERPL-MCNC: 9.4 MG/DL (ref 8.6–10.2)
CHLORIDE SERPL-SCNC: 105 MMOL/L (ref 98–107)
CO2 SERPL-SCNC: 24 MMOL/L (ref 22–29)
CREAT SERPL-MCNC: 0.7 MG/DL (ref 0.5–1)
EOSINOPHIL # BLD: 0.07 K/UL (ref 0.05–0.5)
EOSINOPHILS RELATIVE PERCENT: 2 % (ref 0–6)
ERYTHROCYTE [DISTWIDTH] IN BLOOD BY AUTOMATED COUNT: 15 % (ref 11.5–15)
GFR SERPL CREATININE-BSD FRML MDRD: >60 ML/MIN/1.73M2
GLUCOSE SERPL-MCNC: 150 MG/DL (ref 74–99)
HCT VFR BLD AUTO: 40.4 % (ref 34–48)
HGB BLD-MCNC: 13.4 G/DL (ref 11.5–15.5)
IMM GRANULOCYTES # BLD AUTO: <0.03 K/UL (ref 0–0.58)
IMM GRANULOCYTES NFR BLD: 0 % (ref 0–5)
INR PPP: 1.4
LYMPHOCYTES # BLD: 34 % (ref 20–42)
LYMPHOCYTES NFR BLD: 1.23 K/UL (ref 1.5–4)
MCH RBC QN AUTO: 31 PG (ref 26–35)
MCHC RBC AUTO-ENTMCNC: 33.2 G/DL (ref 32–34.5)
MCV RBC AUTO: 93.5 FL (ref 80–99.9)
MONOCYTES NFR BLD: 0.27 K/UL (ref 0.1–0.95)
MONOCYTES NFR BLD: 8 % (ref 2–12)
NEUTROPHILS NFR BLD: 55 % (ref 43–80)
NEUTS SEG NFR BLD: 1.99 K/UL (ref 1.8–7.3)
PARTIAL THROMBOPLASTIN TIME: 31.6 SEC (ref 24.5–35.1)
PLATELET # BLD AUTO: 50 K/UL (ref 130–450)
PLATELET CONFIRMATION: NORMAL
PMV BLD AUTO: 11.5 FL (ref 7–12)
POTASSIUM SERPL-SCNC: 3.8 MMOL/L (ref 3.5–5)
PROT SERPL-MCNC: 7.6 G/DL (ref 6.4–8.3)
PROTHROMBIN TIME: 15.5 SEC (ref 9.3–12.4)
RBC # BLD AUTO: 4.32 M/UL (ref 3.5–5.5)
SODIUM SERPL-SCNC: 139 MMOL/L (ref 132–146)
WBC OTHER # BLD: 3.6 K/UL (ref 4.5–11.5)

## 2023-07-17 PROCEDURE — 80053 COMPREHEN METABOLIC PANEL: CPT

## 2023-07-17 PROCEDURE — 85730 THROMBOPLASTIN TIME PARTIAL: CPT

## 2023-07-17 PROCEDURE — 36415 COLL VENOUS BLD VENIPUNCTURE: CPT

## 2023-07-17 PROCEDURE — 82105 ALPHA-FETOPROTEIN SERUM: CPT

## 2023-07-17 PROCEDURE — 85027 COMPLETE CBC AUTOMATED: CPT

## 2023-07-17 PROCEDURE — 85610 PROTHROMBIN TIME: CPT

## 2023-07-20 LAB — AFP SERPL-MCNC: 9.1 UG/L

## 2023-07-24 ENCOUNTER — HOSPITAL ENCOUNTER (OUTPATIENT)
Age: 68
Discharge: HOME | End: 2023-07-24
Payer: MEDICARE

## 2023-07-24 PROCEDURE — 36415 COLL VENOUS BLD VENIPUNCTURE: CPT

## 2023-07-24 PROCEDURE — 87536 HIV-1 QUANT&REVRSE TRNSCRPJ: CPT

## 2023-07-31 ENCOUNTER — HOSPITAL ENCOUNTER (OUTPATIENT)
Age: 68
Discharge: HOME | End: 2023-07-31
Payer: MEDICARE

## 2023-07-31 PROCEDURE — 87522 HEPATITIS C REVRS TRNSCRPJ: CPT

## 2023-07-31 PROCEDURE — 36415 COLL VENOUS BLD VENIPUNCTURE: CPT

## 2023-08-04 LAB
HCV RNA # SERPL NAA+PROBE: NOT DETECTED {COPIES}/ML
SPECIMEN SOURCE: NORMAL

## 2023-10-03 ENCOUNTER — HOSPITAL ENCOUNTER (EMERGENCY)
Age: 68
Discharge: HOME | End: 2023-10-03
Payer: MEDICARE

## 2023-10-03 ENCOUNTER — APPOINTMENT (OUTPATIENT)
Dept: GENERAL RADIOLOGY | Age: 68
End: 2023-10-03
Payer: MEDICARE

## 2023-10-03 VITALS
WEIGHT: 122 LBS | DIASTOLIC BLOOD PRESSURE: 54 MMHG | TEMPERATURE: 98.6 F | HEART RATE: 85 BPM | SYSTOLIC BLOOD PRESSURE: 120 MMHG | RESPIRATION RATE: 16 BRPM | BODY MASS INDEX: 19.61 KG/M2 | HEIGHT: 66 IN | OXYGEN SATURATION: 99 %

## 2023-10-03 DIAGNOSIS — S80.212A ABRASION OF LEFT KNEE, INITIAL ENCOUNTER: ICD-10-CM

## 2023-10-03 DIAGNOSIS — S80.02XA CONTUSION OF LEFT KNEE, INITIAL ENCOUNTER: ICD-10-CM

## 2023-10-03 DIAGNOSIS — S89.92XA INJURY OF LEFT KNEE, INITIAL ENCOUNTER: ICD-10-CM

## 2023-10-03 DIAGNOSIS — M25.462 EFFUSION, LEFT KNEE: Primary | ICD-10-CM

## 2023-10-03 PROCEDURE — 73562 X-RAY EXAM OF KNEE 3: CPT

## 2023-10-03 PROCEDURE — 6370000000 HC RX 637 (ALT 250 FOR IP): Performed by: PHYSICIAN ASSISTANT

## 2023-10-03 PROCEDURE — 99283 EMERGENCY DEPT VISIT LOW MDM: CPT

## 2023-10-03 RX ORDER — HYDROCODONE BITARTRATE AND ACETAMINOPHEN 5; 325 MG/1; MG/1
1 TABLET ORAL EVERY 6 HOURS PRN
Qty: 8 TABLET | Refills: 0 | Status: SHIPPED | OUTPATIENT
Start: 2023-10-03 | End: 2023-10-05

## 2023-10-03 RX ORDER — HYDROCODONE BITARTRATE AND ACETAMINOPHEN 5; 325 MG/1; MG/1
1 TABLET ORAL ONCE
Status: COMPLETED | OUTPATIENT
Start: 2023-10-03 | End: 2023-10-03

## 2023-10-03 RX ADMIN — HYDROCODONE BITARTRATE AND ACETAMINOPHEN 1 TABLET: 5; 325 TABLET ORAL at 20:20

## 2023-10-03 ASSESSMENT — PAIN SCALES - GENERAL
PAINLEVEL_OUTOF10: 8
PAINLEVEL_OUTOF10: 7

## 2023-10-03 ASSESSMENT — PAIN - FUNCTIONAL ASSESSMENT: PAIN_FUNCTIONAL_ASSESSMENT: 0-10

## 2023-10-03 ASSESSMENT — PAIN DESCRIPTION - ORIENTATION: ORIENTATION: LEFT

## 2023-10-03 ASSESSMENT — PAIN DESCRIPTION - LOCATION: LOCATION: KNEE

## 2024-01-08 ENCOUNTER — HOSPITAL ENCOUNTER (OUTPATIENT)
Age: 69
Discharge: HOME | End: 2024-01-08
Payer: MEDICARE

## 2024-01-08 LAB
ALBUMIN SERPL-MCNC: 4.3 G/DL (ref 3.5–5.2)
ALP SERPL-CCNC: 84 U/L (ref 35–104)
ALT SERPL-CCNC: 26 U/L (ref 0–32)
ANION GAP SERPL CALCULATED.3IONS-SCNC: 9 MMOL/L (ref 7–16)
AST SERPL-CCNC: 33 U/L (ref 0–31)
BASOPHILS # BLD: 0.02 K/UL (ref 0–0.2)
BASOPHILS NFR BLD: 1 % (ref 0–2)
BILIRUB SERPL-MCNC: 1 MG/DL (ref 0–1.2)
BUN SERPL-MCNC: 16 MG/DL (ref 6–23)
CALCIUM SERPL-MCNC: 9.8 MG/DL (ref 8.6–10.2)
CHLORIDE SERPL-SCNC: 106 MMOL/L (ref 98–107)
CO2 SERPL-SCNC: 22 MMOL/L (ref 22–29)
CREAT SERPL-MCNC: 0.7 MG/DL (ref 0.5–1)
EOSINOPHIL # BLD: 0.04 K/UL (ref 0.05–0.5)
EOSINOPHILS RELATIVE PERCENT: 2 % (ref 0–6)
ERYTHROCYTE [DISTWIDTH] IN BLOOD BY AUTOMATED COUNT: 18 % (ref 11.5–15)
GFR SERPL CREATININE-BSD FRML MDRD: >60 ML/MIN/1.73M2
GLUCOSE SERPL-MCNC: 141 MG/DL (ref 74–99)
HCT VFR BLD AUTO: 28.8 % (ref 34–48)
HGB BLD-MCNC: 7.7 G/DL (ref 11.5–15.5)
IMM GRANULOCYTES # BLD AUTO: <0.03 K/UL (ref 0–0.58)
IMM GRANULOCYTES NFR BLD: 0 % (ref 0–5)
INR PPP: 1.4
LYMPHOCYTES NFR BLD: 0.75 K/UL (ref 1.5–4)
LYMPHOCYTES RELATIVE PERCENT: 29 % (ref 20–42)
MCH RBC QN AUTO: 18.4 PG (ref 26–35)
MCHC RBC AUTO-ENTMCNC: 26.7 G/DL (ref 32–34.5)
MCV RBC AUTO: 68.9 FL (ref 80–99.9)
MONOCYTES NFR BLD: 0.15 K/UL (ref 0.1–0.95)
MONOCYTES NFR BLD: 6 % (ref 2–12)
NEUTROPHILS NFR BLD: 62 % (ref 43–80)
NEUTS SEG NFR BLD: 1.59 K/UL (ref 1.8–7.3)
PARTIAL THROMBOPLASTIN TIME: 35.1 SEC (ref 24.5–35.1)
PLATELET CONFIRMATION: NORMAL
PLATELET, FLUORESCENCE: 63 K/UL (ref 130–450)
PMV BLD AUTO: ABNORMAL FL (ref 7–12)
POTASSIUM SERPL-SCNC: 3.8 MMOL/L (ref 3.5–5)
PROT SERPL-MCNC: 7.9 G/DL (ref 6.4–8.3)
PROTHROMBIN TIME: 15 SEC (ref 9.3–12.4)
RBC # BLD AUTO: 4.18 M/UL (ref 3.5–5.5)
RBC # BLD: ABNORMAL 10*6/UL
SODIUM SERPL-SCNC: 137 MMOL/L (ref 132–146)
WBC OTHER # BLD: 2.6 K/UL (ref 4.5–11.5)

## 2024-01-08 PROCEDURE — 85025 COMPLETE CBC W/AUTO DIFF WBC: CPT

## 2024-01-08 PROCEDURE — 85610 PROTHROMBIN TIME: CPT

## 2024-01-08 PROCEDURE — 36415 COLL VENOUS BLD VENIPUNCTURE: CPT

## 2024-01-08 PROCEDURE — 85730 THROMBOPLASTIN TIME PARTIAL: CPT

## 2024-01-08 PROCEDURE — 80053 COMPREHEN METABOLIC PANEL: CPT

## 2024-01-08 PROCEDURE — 82105 ALPHA-FETOPROTEIN SERUM: CPT

## 2024-01-16 ENCOUNTER — HOSPITAL ENCOUNTER (OUTPATIENT)
Age: 69
Discharge: HOME | End: 2024-01-16
Payer: MEDICARE

## 2024-01-16 LAB
FERRITIN SERPL-MCNC: 10 NG/ML
HCT VFR BLD AUTO: 27.7 % (ref 34–48)
HGB BLD-MCNC: 7.3 G/DL (ref 11.5–15.5)
IRON SATN MFR SERPL: 4 % (ref 15–50)
IRON SERPL-MCNC: 23 UG/DL (ref 37–145)
TIBC SERPL-MCNC: 516 UG/DL (ref 250–450)

## 2024-01-16 PROCEDURE — 83550 IRON BINDING TEST: CPT

## 2024-01-16 PROCEDURE — 85018 HEMOGLOBIN: CPT

## 2024-01-16 PROCEDURE — 82728 ASSAY OF FERRITIN: CPT

## 2024-01-16 PROCEDURE — 83540 ASSAY OF IRON: CPT

## 2024-01-16 PROCEDURE — 85014 HEMATOCRIT: CPT

## 2024-01-16 PROCEDURE — 36415 COLL VENOUS BLD VENIPUNCTURE: CPT

## 2024-01-25 LAB — AFP SERPL-MCNC: 5.2 UG/L

## 2024-04-26 ENCOUNTER — HOSPITAL ENCOUNTER (OUTPATIENT)
Age: 69
Discharge: HOME | End: 2024-04-26
Payer: MEDICARE

## 2024-04-26 LAB
ALBUMIN SERPL-MCNC: 4.3 G/DL (ref 3.5–5.2)
ALP SERPL-CCNC: 113 U/L (ref 35–104)
ALT SERPL-CCNC: 18 U/L (ref 0–32)
ANION GAP SERPL CALCULATED.3IONS-SCNC: 10 MMOL/L (ref 7–16)
AST SERPL-CCNC: 28 U/L (ref 0–31)
BASOPHILS # BLD: 0.01 K/UL (ref 0–0.2)
BASOPHILS NFR BLD: 1 % (ref 0–2)
BILIRUB SERPL-MCNC: 1 MG/DL (ref 0–1.2)
BUN SERPL-MCNC: 10 MG/DL (ref 6–23)
CALCIUM SERPL-MCNC: 9.6 MG/DL (ref 8.6–10.2)
CHLORIDE SERPL-SCNC: 106 MMOL/L (ref 98–107)
CO2 SERPL-SCNC: 24 MMOL/L (ref 22–29)
CREAT SERPL-MCNC: 0.7 MG/DL (ref 0.5–1)
EOSINOPHIL # BLD: 0.07 K/UL (ref 0.05–0.5)
EOSINOPHILS RELATIVE PERCENT: 4 % (ref 0–6)
ERYTHROCYTE [DISTWIDTH] IN BLOOD BY AUTOMATED COUNT: 17 % (ref 11.5–15)
FERRITIN SERPL-MCNC: 16 NG/ML
GFR SERPL CREATININE-BSD FRML MDRD: >90 ML/MIN/1.73M2
GLUCOSE SERPL-MCNC: 187 MG/DL (ref 74–99)
HCT VFR BLD AUTO: 31 % (ref 34–48)
HGB BLD-MCNC: 8.3 G/DL (ref 11.5–15.5)
IMM GRANULOCYTES # BLD AUTO: <0.03 K/UL (ref 0–0.58)
IMM GRANULOCYTES NFR BLD: 1 % (ref 0–5)
INR PPP: 1.4
IRON SATN MFR SERPL: 4 % (ref 15–50)
IRON SERPL-MCNC: 17 UG/DL (ref 37–145)
LYMPHOCYTES NFR BLD: 0.49 K/UL (ref 1.5–4)
LYMPHOCYTES RELATIVE PERCENT: 27 % (ref 20–42)
MCH RBC QN AUTO: 19.9 PG (ref 26–35)
MCHC RBC AUTO-ENTMCNC: 26.8 G/DL (ref 32–34.5)
MCV RBC AUTO: 74.2 FL (ref 80–99.9)
MONOCYTES NFR BLD: 0.12 K/UL (ref 0.1–0.95)
MONOCYTES NFR BLD: 7 % (ref 2–12)
NEUTROPHILS NFR BLD: 61 % (ref 43–80)
NEUTS SEG NFR BLD: 1.1 K/UL (ref 1.8–7.3)
PARTIAL THROMBOPLASTIN TIME: 36.2 SEC (ref 24.5–35.1)
PLATELET # BLD AUTO: 52 K/UL (ref 130–450)
PLATELET CONFIRMATION: NORMAL
PMV BLD AUTO: 9.4 FL (ref 7–12)
POTASSIUM SERPL-SCNC: 4.2 MMOL/L (ref 3.5–5)
PROT SERPL-MCNC: 7.7 G/DL (ref 6.4–8.3)
PROTHROMBIN TIME: 15.2 SEC (ref 9.3–12.4)
RBC # BLD AUTO: 4.18 M/UL (ref 3.5–5.5)
RBC # BLD: ABNORMAL 10*6/UL
SODIUM SERPL-SCNC: 140 MMOL/L (ref 132–146)
TIBC SERPL-MCNC: 449 UG/DL (ref 250–450)
WBC OTHER # BLD: 1.8 K/UL (ref 4.5–11.5)

## 2024-04-26 PROCEDURE — 85730 THROMBOPLASTIN TIME PARTIAL: CPT

## 2024-04-26 PROCEDURE — 83540 ASSAY OF IRON: CPT

## 2024-04-26 PROCEDURE — 80053 COMPREHEN METABOLIC PANEL: CPT

## 2024-04-26 PROCEDURE — 83550 IRON BINDING TEST: CPT

## 2024-04-26 PROCEDURE — 36415 COLL VENOUS BLD VENIPUNCTURE: CPT

## 2024-04-26 PROCEDURE — 85610 PROTHROMBIN TIME: CPT

## 2024-04-26 PROCEDURE — 82728 ASSAY OF FERRITIN: CPT

## 2024-04-26 PROCEDURE — 85025 COMPLETE CBC W/AUTO DIFF WBC: CPT

## 2024-04-26 PROCEDURE — 82105 ALPHA-FETOPROTEIN SERUM: CPT

## 2024-04-27 LAB — AFP SERPL-MCNC: 4.7 UG/L

## 2024-07-15 ENCOUNTER — HOSPITAL ENCOUNTER (OUTPATIENT)
Age: 69
Discharge: HOME | End: 2024-07-15
Payer: MEDICARE

## 2024-07-15 LAB
ALBUMIN SERPL-MCNC: 4.1 G/DL (ref 3.5–5.2)
ALP SERPL-CCNC: 101 U/L (ref 35–104)
ALT SERPL-CCNC: 21 U/L (ref 0–32)
ANION GAP SERPL CALCULATED.3IONS-SCNC: 10 MMOL/L (ref 7–16)
AST SERPL-CCNC: 31 U/L (ref 0–31)
BASOPHILS # BLD: 0.01 K/UL (ref 0–0.2)
BASOPHILS NFR BLD: 0 % (ref 0–2)
BILIRUB SERPL-MCNC: 0.8 MG/DL (ref 0–1.2)
BUN SERPL-MCNC: 10 MG/DL (ref 6–23)
CALCIUM SERPL-MCNC: 9 MG/DL (ref 8.6–10.2)
CHLORIDE SERPL-SCNC: 107 MMOL/L (ref 98–107)
CO2 SERPL-SCNC: 23 MMOL/L (ref 22–29)
CREAT SERPL-MCNC: 0.7 MG/DL (ref 0.5–1)
EOSINOPHIL # BLD: 0.09 K/UL (ref 0.05–0.5)
EOSINOPHILS RELATIVE PERCENT: 4 % (ref 0–6)
ERYTHROCYTE [DISTWIDTH] IN BLOOD BY AUTOMATED COUNT: 17.3 % (ref 11.5–15)
FERRITIN SERPL-MCNC: 13 NG/ML
GFR, ESTIMATED: >90 ML/MIN/1.73M2
GLUCOSE SERPL-MCNC: 176 MG/DL (ref 74–99)
HCT VFR BLD AUTO: 33.4 % (ref 34–48)
HGB BLD-MCNC: 9.8 G/DL (ref 11.5–15.5)
IMM GRANULOCYTES # BLD AUTO: <0.03 K/UL (ref 0–0.58)
IMM GRANULOCYTES NFR BLD: 0 % (ref 0–5)
INR PPP: 1.5
IRON SATN MFR SERPL: 6 % (ref 15–50)
IRON SERPL-MCNC: 23 UG/DL (ref 37–145)
LYMPHOCYTES NFR BLD: 0.67 K/UL (ref 1.5–4)
LYMPHOCYTES RELATIVE PERCENT: 29 % (ref 20–42)
MCH RBC QN AUTO: 23.6 PG (ref 26–35)
MCHC RBC AUTO-ENTMCNC: 29.3 G/DL (ref 32–34.5)
MCV RBC AUTO: 80.5 FL (ref 80–99.9)
MONOCYTES NFR BLD: 0.2 K/UL (ref 0.1–0.95)
MONOCYTES NFR BLD: 9 % (ref 2–12)
NEUTROPHILS NFR BLD: 58 % (ref 43–80)
NEUTS SEG NFR BLD: 1.33 K/UL (ref 1.8–7.3)
PARTIAL THROMBOPLASTIN TIME: 36.8 SEC (ref 24.5–35.1)
PLATELET # BLD AUTO: 57 K/UL (ref 130–450)
PLATELET CONFIRMATION: NORMAL
PMV BLD AUTO: 10.6 FL (ref 7–12)
POTASSIUM SERPL-SCNC: 4.2 MMOL/L (ref 3.5–5)
PROT SERPL-MCNC: 7.4 G/DL (ref 6.4–8.3)
PROTHROMBIN TIME: 15.5 SEC (ref 9.3–12.4)
RBC # BLD AUTO: 4.15 M/UL (ref 3.5–5.5)
SODIUM SERPL-SCNC: 140 MMOL/L (ref 132–146)
TIBC SERPL-MCNC: 398 UG/DL (ref 250–450)
WBC OTHER # BLD: 2.3 K/UL (ref 4.5–11.5)

## 2024-07-15 PROCEDURE — 36415 COLL VENOUS BLD VENIPUNCTURE: CPT

## 2024-07-15 PROCEDURE — 85025 COMPLETE CBC W/AUTO DIFF WBC: CPT

## 2024-07-15 PROCEDURE — 80053 COMPREHEN METABOLIC PANEL: CPT

## 2024-07-15 PROCEDURE — 85610 PROTHROMBIN TIME: CPT

## 2024-07-15 PROCEDURE — 83550 IRON BINDING TEST: CPT

## 2024-07-15 PROCEDURE — 83540 ASSAY OF IRON: CPT

## 2024-07-15 PROCEDURE — 82728 ASSAY OF FERRITIN: CPT

## 2024-07-15 PROCEDURE — 85730 THROMBOPLASTIN TIME PARTIAL: CPT

## 2024-07-25 DIAGNOSIS — D50.9 IRON DEFICIENCY ANEMIA, UNSPECIFIED IRON DEFICIENCY ANEMIA TYPE: Primary | ICD-10-CM

## 2024-07-25 RX ORDER — SODIUM CHLORIDE 9 MG/ML
5-250 INJECTION, SOLUTION INTRAVENOUS PRN
OUTPATIENT
Start: 2024-07-28

## 2024-07-25 RX ORDER — ACETAMINOPHEN 325 MG/1
650 TABLET ORAL
OUTPATIENT
Start: 2024-07-28

## 2024-07-25 RX ORDER — ALBUTEROL SULFATE 90 UG/1
4 AEROSOL, METERED RESPIRATORY (INHALATION) PRN
OUTPATIENT
Start: 2024-07-28

## 2024-07-25 RX ORDER — SODIUM CHLORIDE 9 MG/ML
INJECTION, SOLUTION INTRAVENOUS CONTINUOUS
OUTPATIENT
Start: 2024-07-28

## 2024-07-25 RX ORDER — SODIUM CHLORIDE 0.9 % (FLUSH) 0.9 %
5-40 SYRINGE (ML) INJECTION PRN
OUTPATIENT
Start: 2024-07-28

## 2024-07-25 RX ORDER — DIPHENHYDRAMINE HYDROCHLORIDE 50 MG/ML
50 INJECTION INTRAMUSCULAR; INTRAVENOUS
OUTPATIENT
Start: 2024-07-28

## 2024-07-25 RX ORDER — EPINEPHRINE 1 MG/ML
0.3 INJECTION, SOLUTION, CONCENTRATE INTRAVENOUS PRN
OUTPATIENT
Start: 2024-07-28

## 2024-07-25 RX ORDER — ONDANSETRON 2 MG/ML
8 INJECTION INTRAMUSCULAR; INTRAVENOUS
OUTPATIENT
Start: 2024-07-28

## 2024-08-09 ENCOUNTER — HOSPITAL ENCOUNTER (OUTPATIENT)
Dept: INFUSION THERAPY | Age: 69
Setting detail: INFUSION SERIES
Discharge: HOME | End: 2024-08-09
Payer: MEDICARE

## 2024-08-09 VITALS
SYSTOLIC BLOOD PRESSURE: 167 MMHG | RESPIRATION RATE: 16 BRPM | OXYGEN SATURATION: 98 % | BODY MASS INDEX: 21.05 KG/M2 | HEIGHT: 66 IN | WEIGHT: 131 LBS | HEART RATE: 67 BPM | TEMPERATURE: 97.3 F | DIASTOLIC BLOOD PRESSURE: 81 MMHG

## 2024-08-09 DIAGNOSIS — D50.9 IRON DEFICIENCY ANEMIA, UNSPECIFIED IRON DEFICIENCY ANEMIA TYPE: Primary | ICD-10-CM

## 2024-08-09 PROCEDURE — 96365 THER/PROPH/DIAG IV INF INIT: CPT

## 2024-08-09 PROCEDURE — 6360000002 HC RX W HCPCS: Performed by: NURSE PRACTITIONER

## 2024-08-09 PROCEDURE — 2580000003 HC RX 258: Performed by: NURSE PRACTITIONER

## 2024-08-09 RX ORDER — ONDANSETRON 2 MG/ML
8 INJECTION INTRAMUSCULAR; INTRAVENOUS
Status: CANCELLED | OUTPATIENT
Start: 2024-08-16

## 2024-08-09 RX ORDER — SODIUM CHLORIDE 9 MG/ML
5-250 INJECTION, SOLUTION INTRAVENOUS PRN
Status: DISCONTINUED | OUTPATIENT
Start: 2024-08-09 | End: 2024-08-10 | Stop reason: HOSPADM

## 2024-08-09 RX ORDER — SODIUM CHLORIDE 9 MG/ML
5-250 INJECTION, SOLUTION INTRAVENOUS PRN
Status: CANCELLED | OUTPATIENT
Start: 2024-08-16

## 2024-08-09 RX ORDER — ACETAMINOPHEN 325 MG/1
650 TABLET ORAL
Status: CANCELLED | OUTPATIENT
Start: 2024-08-16

## 2024-08-09 RX ORDER — SODIUM CHLORIDE 0.9 % (FLUSH) 0.9 %
5-40 SYRINGE (ML) INJECTION PRN
Status: DISCONTINUED | OUTPATIENT
Start: 2024-08-09 | End: 2024-08-10 | Stop reason: HOSPADM

## 2024-08-09 RX ORDER — SODIUM CHLORIDE 9 MG/ML
INJECTION, SOLUTION INTRAVENOUS CONTINUOUS
Status: CANCELLED | OUTPATIENT
Start: 2024-08-16

## 2024-08-09 RX ORDER — ALBUTEROL SULFATE 90 UG/1
4 AEROSOL, METERED RESPIRATORY (INHALATION) PRN
Status: CANCELLED | OUTPATIENT
Start: 2024-08-16

## 2024-08-09 RX ORDER — EPINEPHRINE 1 MG/ML
0.3 INJECTION, SOLUTION, CONCENTRATE INTRAVENOUS PRN
Status: CANCELLED | OUTPATIENT
Start: 2024-08-16

## 2024-08-09 RX ORDER — SODIUM CHLORIDE 0.9 % (FLUSH) 0.9 %
5-40 SYRINGE (ML) INJECTION PRN
Status: CANCELLED | OUTPATIENT
Start: 2024-08-16

## 2024-08-09 RX ORDER — DIPHENHYDRAMINE HYDROCHLORIDE 50 MG/ML
50 INJECTION INTRAMUSCULAR; INTRAVENOUS
Status: CANCELLED | OUTPATIENT
Start: 2024-08-16

## 2024-08-09 RX ADMIN — SODIUM CHLORIDE, PRESERVATIVE FREE 10 ML: 5 INJECTION INTRAVENOUS at 07:31

## 2024-08-09 RX ADMIN — FERRIC CARBOXYMALTOSE INJECTION 750 MG: 50 INJECTION, SOLUTION INTRAVENOUS at 07:35

## 2024-08-09 RX ADMIN — SODIUM CHLORIDE 20 ML/HR: 9 INJECTION, SOLUTION INTRAVENOUS at 07:32

## 2024-08-09 RX ADMIN — SODIUM CHLORIDE, PRESERVATIVE FREE 10 ML: 5 INJECTION INTRAVENOUS at 07:59

## 2024-08-16 ENCOUNTER — HOSPITAL ENCOUNTER (OUTPATIENT)
Dept: INFUSION THERAPY | Age: 69
Setting detail: INFUSION SERIES
Discharge: HOME | End: 2024-08-16
Payer: MEDICARE

## 2024-08-16 VITALS
BODY MASS INDEX: 21.05 KG/M2 | HEART RATE: 65 BPM | RESPIRATION RATE: 16 BRPM | SYSTOLIC BLOOD PRESSURE: 156 MMHG | TEMPERATURE: 98.3 F | WEIGHT: 131 LBS | OXYGEN SATURATION: 100 % | DIASTOLIC BLOOD PRESSURE: 86 MMHG | HEIGHT: 66 IN

## 2024-08-16 DIAGNOSIS — D50.9 IRON DEFICIENCY ANEMIA, UNSPECIFIED IRON DEFICIENCY ANEMIA TYPE: Primary | ICD-10-CM

## 2024-08-16 PROCEDURE — 2580000003 HC RX 258: Performed by: NURSE PRACTITIONER

## 2024-08-16 PROCEDURE — 6360000002 HC RX W HCPCS: Performed by: NURSE PRACTITIONER

## 2024-08-16 PROCEDURE — 96365 THER/PROPH/DIAG IV INF INIT: CPT

## 2024-08-16 RX ORDER — EPINEPHRINE 1 MG/ML
0.3 INJECTION, SOLUTION, CONCENTRATE INTRAVENOUS PRN
OUTPATIENT
Start: 2024-08-16

## 2024-08-16 RX ORDER — ACETAMINOPHEN 325 MG/1
650 TABLET ORAL
OUTPATIENT
Start: 2024-08-16

## 2024-08-16 RX ORDER — SODIUM CHLORIDE 9 MG/ML
INJECTION, SOLUTION INTRAVENOUS CONTINUOUS
OUTPATIENT
Start: 2024-08-16

## 2024-08-16 RX ORDER — SODIUM CHLORIDE 0.9 % (FLUSH) 0.9 %
5-40 SYRINGE (ML) INJECTION PRN
OUTPATIENT
Start: 2024-08-16

## 2024-08-16 RX ORDER — SODIUM CHLORIDE 9 MG/ML
5-250 INJECTION, SOLUTION INTRAVENOUS PRN
Status: DISCONTINUED | OUTPATIENT
Start: 2024-08-16 | End: 2024-08-17 | Stop reason: HOSPADM

## 2024-08-16 RX ORDER — ONDANSETRON 2 MG/ML
8 INJECTION INTRAMUSCULAR; INTRAVENOUS
OUTPATIENT
Start: 2024-08-16

## 2024-08-16 RX ORDER — DIPHENHYDRAMINE HYDROCHLORIDE 50 MG/ML
50 INJECTION INTRAMUSCULAR; INTRAVENOUS
OUTPATIENT
Start: 2024-08-16

## 2024-08-16 RX ORDER — ALBUTEROL SULFATE 90 UG/1
4 AEROSOL, METERED RESPIRATORY (INHALATION) PRN
OUTPATIENT
Start: 2024-08-16

## 2024-08-16 RX ORDER — SODIUM CHLORIDE 9 MG/ML
5-250 INJECTION, SOLUTION INTRAVENOUS PRN
Status: CANCELLED | OUTPATIENT
Start: 2024-08-16

## 2024-08-16 RX ORDER — SODIUM CHLORIDE 0.9 % (FLUSH) 0.9 %
5-40 SYRINGE (ML) INJECTION PRN
Status: DISCONTINUED | OUTPATIENT
Start: 2024-08-16 | End: 2024-08-17 | Stop reason: HOSPADM

## 2024-08-16 RX ADMIN — FERRIC CARBOXYMALTOSE INJECTION 750 MG: 50 INJECTION, SOLUTION INTRAVENOUS at 07:31

## 2024-08-16 RX ADMIN — SODIUM CHLORIDE 20 ML/HR: 9 INJECTION, SOLUTION INTRAVENOUS at 07:17

## 2024-08-16 RX ADMIN — SODIUM CHLORIDE, PRESERVATIVE FREE 10 ML: 5 INJECTION INTRAVENOUS at 07:17

## 2024-08-16 NOTE — PROGRESS NOTES
Tolerated infusion well.  Reviewed therapy plan, offered education material and/or discharge material, reviewed medication information and signs and symptoms  and educated on possible side effects, verbalizes good knowledge of current plan patient verbalizes understanding, and has no signs or symptoms to report at this time.   Patient discharged. Patient alert and oriented x3.   No distress noted.   Vital signs stable.   Patient denies any new or worsening pain.  Patient denies any needs.  All questions answered.  Next appointment scheduled.  Patient stayed for 30 minute observation after completion of infusion.

## 2025-01-16 ENCOUNTER — HOSPITAL ENCOUNTER (OUTPATIENT)
Age: 70
Discharge: HOME | End: 2025-01-16
Payer: MEDICARE

## 2025-01-16 LAB
ALBUMIN SERPL-MCNC: 4.2 G/DL (ref 3.5–5.2)
ALP SERPL-CCNC: 94 U/L (ref 35–104)
ALT SERPL-CCNC: 18 U/L (ref 0–32)
ANION GAP SERPL CALCULATED.3IONS-SCNC: 10 MMOL/L (ref 7–16)
AST SERPL-CCNC: 21 U/L (ref 0–31)
BASOPHILS # BLD: 0 K/UL (ref 0–0.2)
BASOPHILS NFR BLD: 0 % (ref 0–2)
BILIRUB SERPL-MCNC: 0.8 MG/DL (ref 0–1.2)
BUN SERPL-MCNC: 14 MG/DL (ref 6–23)
CALCIUM SERPL-MCNC: 8.7 MG/DL (ref 8.6–10.2)
CHLORIDE SERPL-SCNC: 108 MMOL/L (ref 98–107)
CO2 SERPL-SCNC: 21 MMOL/L (ref 22–29)
CREAT SERPL-MCNC: 0.7 MG/DL (ref 0.5–1)
EOSINOPHIL # BLD: 0.03 K/UL (ref 0.05–0.5)
EOSINOPHILS RELATIVE PERCENT: 2 % (ref 0–6)
ERYTHROCYTE [DISTWIDTH] IN BLOOD BY AUTOMATED COUNT: 14.8 % (ref 11.5–15)
FERRITIN SERPL-MCNC: 10 NG/ML
GFR, ESTIMATED: >90 ML/MIN/1.73M2
GLUCOSE SERPL-MCNC: 214 MG/DL (ref 74–99)
HCT VFR BLD AUTO: 27.3 % (ref 34–48)
HGB BLD-MCNC: 7.6 G/DL (ref 11.5–15.5)
INR PPP: 1.3
IRON SATN MFR SERPL: 5 % (ref 15–50)
IRON SERPL-MCNC: 21 UG/DL (ref 37–145)
LYMPHOCYTES NFR BLD: 0.61 K/UL (ref 1.5–4)
LYMPHOCYTES RELATIVE PERCENT: 30 % (ref 20–42)
MCH RBC QN AUTO: 21.6 PG (ref 26–35)
MCHC RBC AUTO-ENTMCNC: 27.8 G/DL (ref 32–34.5)
MCV RBC AUTO: 77.6 FL (ref 80–99.9)
MONOCYTES NFR BLD: 0.07 K/UL (ref 0.1–0.95)
MONOCYTES NFR BLD: 4 % (ref 2–12)
NEUTROPHILS NFR BLD: 64 % (ref 43–80)
NEUTS SEG NFR BLD: 1.29 K/UL (ref 1.8–7.3)
PLATELET # BLD AUTO: 57 K/UL (ref 130–450)
PLATELET CONFIRMATION: NORMAL
PMV BLD AUTO: 10.6 FL (ref 7–12)
POTASSIUM SERPL-SCNC: 3.9 MMOL/L (ref 3.5–5)
PROT SERPL-MCNC: 7.6 G/DL (ref 6.4–8.3)
PROTHROMBIN TIME: 14.1 SEC (ref 9.3–12.4)
RBC # BLD AUTO: 3.52 M/UL (ref 3.5–5.5)
RBC # BLD: ABNORMAL 10*6/UL
SODIUM SERPL-SCNC: 139 MMOL/L (ref 132–146)
TIBC SERPL-MCNC: 439 UG/DL (ref 250–450)
WBC OTHER # BLD: 2 K/UL (ref 4.5–11.5)

## 2025-01-16 PROCEDURE — 80053 COMPREHEN METABOLIC PANEL: CPT

## 2025-01-16 PROCEDURE — 82105 ALPHA-FETOPROTEIN SERUM: CPT

## 2025-01-16 PROCEDURE — 83540 ASSAY OF IRON: CPT

## 2025-01-16 PROCEDURE — 85610 PROTHROMBIN TIME: CPT

## 2025-01-16 PROCEDURE — 36415 COLL VENOUS BLD VENIPUNCTURE: CPT

## 2025-01-16 PROCEDURE — 82728 ASSAY OF FERRITIN: CPT

## 2025-01-16 PROCEDURE — 85025 COMPLETE CBC W/AUTO DIFF WBC: CPT

## 2025-01-16 PROCEDURE — 83550 IRON BINDING TEST: CPT

## 2025-01-18 LAB — AFP SERPL-MCNC: 4 UG/L

## 2025-02-13 ENCOUNTER — OFFICE VISIT (OUTPATIENT)
Dept: ONCOLOGY | Age: 70
End: 2025-02-13
Payer: MEDICARE

## 2025-02-13 ENCOUNTER — HOSPITAL ENCOUNTER (OUTPATIENT)
Dept: INFUSION THERAPY | Age: 70
Discharge: HOME | End: 2025-02-13
Payer: MEDICARE

## 2025-02-13 VITALS
WEIGHT: 138.2 LBS | BODY MASS INDEX: 22.21 KG/M2 | OXYGEN SATURATION: 100 % | HEIGHT: 66 IN | TEMPERATURE: 98 F | SYSTOLIC BLOOD PRESSURE: 161 MMHG | DIASTOLIC BLOOD PRESSURE: 71 MMHG | HEART RATE: 80 BPM

## 2025-02-13 DIAGNOSIS — D50.9 IRON DEFICIENCY ANEMIA, UNSPECIFIED IRON DEFICIENCY ANEMIA TYPE: Primary | ICD-10-CM

## 2025-02-13 DIAGNOSIS — D50.9 IRON DEFICIENCY ANEMIA, UNSPECIFIED IRON DEFICIENCY ANEMIA TYPE: ICD-10-CM

## 2025-02-13 LAB
BASOPHILS # BLD: 0.03 K/UL (ref 0–0.2)
BASOPHILS NFR BLD: 1 % (ref 0–2)
EOSINOPHIL # BLD: 0.03 K/UL (ref 0.05–0.5)
EOSINOPHILS RELATIVE PERCENT: 1 % (ref 0–6)
ERYTHROCYTE [DISTWIDTH] IN BLOOD BY AUTOMATED COUNT: ABNORMAL % (ref 11.5–15)
FOLATE SERPL-MCNC: 13.2 NG/ML (ref 4.8–24.2)
HAPTOGLOB SERPL-MCNC: 44 MG/DL (ref 30–200)
HCT VFR BLD AUTO: 31.9 % (ref 34–48)
HGB BLD-MCNC: 8.5 G/DL (ref 11.5–15.5)
HGB ELECTROPHORESIS INTERP: NORMAL
IMM GRANULOCYTES # BLD AUTO: <0.03 K/UL (ref 0–0.58)
IMM GRANULOCYTES NFR BLD: 1 % (ref 0–5)
IMM RETICS NFR: NORMAL % (ref 2.7–18.3)
LYMPHOCYTES NFR BLD: 0.51 K/UL (ref 1.5–4)
LYMPHOCYTES RELATIVE PERCENT: 21 % (ref 20–42)
MCH RBC QN AUTO: 22.3 PG (ref 26–35)
MCHC RBC AUTO-ENTMCNC: 26.6 G/DL (ref 32–34.5)
MCV RBC AUTO: 83.7 FL (ref 80–99.9)
MONOCYTES NFR BLD: 0.14 K/UL (ref 0.1–0.95)
MONOCYTES NFR BLD: 6 % (ref 2–12)
NEUTROPHILS NFR BLD: 69 % (ref 43–80)
NEUTS SEG NFR BLD: 1.65 K/UL (ref 1.8–7.3)
PATHOLOGIST: NORMAL
PLATELET CONFIRMATION: NORMAL
PLATELET, FLUORESCENCE: 50 K/UL (ref 130–450)
PMV BLD AUTO: ABNORMAL FL (ref 7–12)
RBC # BLD AUTO: 3.81 M/UL (ref 3.5–5.5)
RBC # BLD: ABNORMAL 10*6/UL
RETIC HEMOGLOBIN: NORMAL PG (ref 28.2–35.7)
RETICS # AUTO: NORMAL M/UL (ref 0.03–0.08)
RETICS/RBC NFR AUTO: NORMAL % (ref 0.5–1.9)
RETICS/RBC NFR MANUAL: 4.7 % (ref 0.5–1.5)
VIT B12 SERPL-MCNC: >2000 PG/ML (ref 211–946)
WBC OTHER # BLD: 2.4 K/UL (ref 4.5–11.5)

## 2025-02-13 PROCEDURE — 84155 ASSAY OF PROTEIN SERUM: CPT

## 2025-02-13 PROCEDURE — 82607 VITAMIN B-12: CPT

## 2025-02-13 PROCEDURE — 83521 IG LIGHT CHAINS FREE EACH: CPT

## 2025-02-13 PROCEDURE — 1126F AMNT PAIN NOTED NONE PRSNT: CPT | Performed by: INTERNAL MEDICINE

## 2025-02-13 PROCEDURE — 85044 MANUAL RETICULOCYTE COUNT: CPT

## 2025-02-13 PROCEDURE — 99214 OFFICE O/P EST MOD 30 MIN: CPT

## 2025-02-13 PROCEDURE — 99203 OFFICE O/P NEW LOW 30 MIN: CPT | Performed by: INTERNAL MEDICINE

## 2025-02-13 PROCEDURE — 83010 ASSAY OF HAPTOGLOBIN QUANT: CPT

## 2025-02-13 PROCEDURE — 83020 HEMOGLOBIN ELECTROPHORESIS: CPT

## 2025-02-13 PROCEDURE — 1123F ACP DISCUSS/DSCN MKR DOCD: CPT | Performed by: INTERNAL MEDICINE

## 2025-02-13 PROCEDURE — 82746 ASSAY OF FOLIC ACID SERUM: CPT

## 2025-02-13 PROCEDURE — 1159F MED LIST DOCD IN RCRD: CPT | Performed by: INTERNAL MEDICINE

## 2025-02-13 PROCEDURE — 84165 PROTEIN E-PHORESIS SERUM: CPT

## 2025-02-13 PROCEDURE — 85025 COMPLETE CBC W/AUTO DIFF WBC: CPT

## 2025-02-13 PROCEDURE — 36415 COLL VENOUS BLD VENIPUNCTURE: CPT

## 2025-02-13 RX ORDER — SODIUM CHLORIDE 9 MG/ML
5-250 INJECTION, SOLUTION INTRAVENOUS PRN
OUTPATIENT
Start: 2025-02-13

## 2025-02-13 RX ORDER — ONDANSETRON 2 MG/ML
8 INJECTION INTRAMUSCULAR; INTRAVENOUS
OUTPATIENT
Start: 2025-02-13

## 2025-02-13 RX ORDER — ALBUTEROL SULFATE 90 UG/1
4 INHALANT RESPIRATORY (INHALATION) PRN
OUTPATIENT
Start: 2025-02-13

## 2025-02-13 RX ORDER — SODIUM CHLORIDE 9 MG/ML
INJECTION, SOLUTION INTRAVENOUS CONTINUOUS
OUTPATIENT
Start: 2025-02-13

## 2025-02-13 RX ORDER — HYDROCORTISONE SODIUM SUCCINATE 100 MG/2ML
100 INJECTION INTRAMUSCULAR; INTRAVENOUS
OUTPATIENT
Start: 2025-02-13

## 2025-02-13 RX ORDER — DIPHENHYDRAMINE HYDROCHLORIDE 50 MG/ML
50 INJECTION INTRAMUSCULAR; INTRAVENOUS
OUTPATIENT
Start: 2025-02-13

## 2025-02-13 RX ORDER — SODIUM CHLORIDE 0.9 % (FLUSH) 0.9 %
5-40 SYRINGE (ML) INJECTION PRN
OUTPATIENT
Start: 2025-02-13

## 2025-02-13 RX ORDER — FAMOTIDINE 10 MG/ML
20 INJECTION, SOLUTION INTRAVENOUS
OUTPATIENT
Start: 2025-02-13

## 2025-02-13 RX ORDER — HEPARIN SODIUM (PORCINE) LOCK FLUSH IV SOLN 100 UNIT/ML 100 UNIT/ML
500 SOLUTION INTRAVENOUS PRN
OUTPATIENT
Start: 2025-02-13

## 2025-02-13 RX ORDER — ACETAMINOPHEN 325 MG/1
650 TABLET ORAL
OUTPATIENT
Start: 2025-02-13

## 2025-02-13 RX ORDER — EPINEPHRINE 1 MG/ML
0.3 INJECTION, SOLUTION, CONCENTRATE INTRAVENOUS PRN
OUTPATIENT
Start: 2025-02-13

## 2025-02-13 RX ORDER — FERROUS SULFATE 325(65) MG
325 TABLET ORAL EVERY OTHER DAY
Qty: 90 TABLET | Refills: 1 | Status: SHIPPED | OUTPATIENT
Start: 2025-02-13

## 2025-02-13 NOTE — PROGRESS NOTES
Kortney Iglesias  1955 69 y.o.      Referring Physician:     PCP: Hemant Mcdaniels DO    Vitals:    25 1423   BP: (!) 161/71   Pulse: 80   Temp: 98 °F (36.7 °C)   SpO2: 100%        Wt Readings from Last 3 Encounters:   25 62.7 kg (138 lb 3.2 oz)   24 59.4 kg (131 lb)   24 59.4 kg (131 lb)        Body mass index is 22.31 kg/m².          Chief Complaint:   Chief Complaint   Patient presents with    New Patient           LMP: 40's     Age at first Menses: 14    : 1    Para: 1          Current Outpatient Medications:     ferrous sulfate (IRON 325) 325 (65 Fe) MG tablet, Take 1 tablet by mouth 2 times daily (with meals), Disp: 30 tablet, Rfl: 3    empagliflozin (JARDIANCE) 10 MG tablet, Take 1 tablet by mouth daily, Disp: 30 tablet, Rfl: 3       Past Medical History:   Diagnosis Date    Diabetes mellitus (HCC)     Hypertension        Past Surgical History:   Procedure Laterality Date    COLONOSCOPY N/A 10/14/2022    COLONOSCOPY DIAGNOSTIC performed by Melanie Lewis MD at [facility] ENDOSCOPY    UPPER GASTROINTESTINAL ENDOSCOPY N/A 10/14/2022    EGD BIOPSY performed by Melanie Lewis MD at [facility] ENDOSCOPY       History reviewed. No pertinent family history.    Social History     Socioeconomic History    Marital status:      Spouse name: Not on file    Number of children: Not on file    Years of education: Not on file    Highest education level: Not on file   Occupational History    Not on file   Tobacco Use    Smoking status: Former     Current packs/day: 0.00     Types: Cigarettes     Quit date: 3/21/2007     Years since quittin.9    Smokeless tobacco: Never   Substance and Sexual Activity    Alcohol use: No    Drug use: No    Sexual activity: Not on file   Other Topics Concern    Not on file   Social History Narrative    Not on file     Social Determinants of Health     Financial Resource Strain: Not on file   Food Insecurity: Not on file   Transportation Needs: Not on file   Physical

## 2025-02-13 NOTE — PROGRESS NOTES
[facility] MEDICAL ONCOLOGY  [address] [address]  Dept: [phone]  Loc: [phone]    Clinic Consultation Note      Date of Encounter: 02/13/2025     Referring Provider:  outside provider    Reason for Visit:   anemia        PCP:  Hemant Mcdaniels DO    Demographics: 69 y.o. female    Chief Complaint   Patient presents with    New Patient         History of Present Illness of Initial Consultation :  The patient is a 69 y.o. female with history of diabetes, hypertension, hepatitis C treated years ago, cirrhosis who is presenting as a referral due to iron deficiency anemia, patient is here with her , she is feeling well without any new complaints, no fevers, no chills, no night sweats and she declined any blood in the stool or the urine also she declined any dark-colored stool.  She had EGD on 1/2024 which showed no dysplastic changes in the stomach.  She said that she has scopes coming next month with her GI physicians  She had recurrent issue with iron deficiency anemia and she had received Injectafer about a year ago.  She does not remember ever having any issues with varices  No personal or family history of any blood disorder, no personal or family history of liver disease, she does not know of any family history of thalassemia or any other blood disorder.  No personal history, history of cancer in her dad possibly of the stomach  She stopped smoking 2007 and also stopped drinking alcohol around that year no other drugs    -Upep 2022 without monoclonal protein      ONCOLOGIC HISTORY:    Oncology History    No history exists.           Past Medical History:   Diagnosis Date    Diabetes mellitus (HCC)     Hypertension        Past Surgical History:   Procedure Laterality Date    COLONOSCOPY N/A 10/14/2022    COLONOSCOPY DIAGNOSTIC performed by Melanie Lewis MD at [facility] ENDOSCOPY    UPPER GASTROINTESTINAL ENDOSCOPY N/A

## 2025-02-14 LAB
ALBUMIN SERPL-MCNC: 3.7 G/DL (ref 3.5–4.7)
ALPHA1 GLOB SERPL ELPH-MCNC: 0.3 G/DL (ref 0.2–0.4)
ALPHA2 GLOB SERPL ELPH-MCNC: 0.7 G/DL (ref 0.5–1)
B-GLOBULIN SERPL ELPH-MCNC: 1 G/DL (ref 0.8–1.3)
GAMMA GLOB SERPL ELPH-MCNC: 1.6 G/DL (ref 0.7–1.6)
PATH REV BLD -IMP: NORMAL
PATHOLOGIST: NORMAL
PROT PATTERN SERPL ELPH-IMP: NORMAL
PROT SERPL-MCNC: 7.2 G/DL (ref 6.4–8.3)
SEND OUT REPORT: NORMAL
TEST NAME: NORMAL

## 2025-02-15 LAB
FREE KAPPA/LAMBDA RATIO: 1.8 (ref 0.22–1.74)
KAPPA LC FREE SER-MCNC: 41.3 MG/L
LAMBDA LC FREE SERPL-MCNC: 23 MG/L (ref 4.2–27.7)

## 2025-02-17 ENCOUNTER — HOSPITAL ENCOUNTER (OUTPATIENT)
Dept: INFUSION THERAPY | Age: 70
Discharge: HOME | End: 2025-02-17
Payer: MEDICARE

## 2025-02-17 VITALS
SYSTOLIC BLOOD PRESSURE: 169 MMHG | RESPIRATION RATE: 18 BRPM | DIASTOLIC BLOOD PRESSURE: 79 MMHG | HEART RATE: 63 BPM | TEMPERATURE: 97.7 F | OXYGEN SATURATION: 99 %

## 2025-02-17 DIAGNOSIS — D50.9 IRON DEFICIENCY ANEMIA, UNSPECIFIED IRON DEFICIENCY ANEMIA TYPE: Primary | ICD-10-CM

## 2025-02-17 LAB
HGB ELECTROPHORESIS INTERP: NORMAL
PATHOLOGIST: NORMAL

## 2025-02-17 PROCEDURE — 96374 THER/PROPH/DIAG INJ IV PUSH: CPT

## 2025-02-17 PROCEDURE — 6360000002 HC RX W HCPCS: Performed by: INTERNAL MEDICINE

## 2025-02-17 PROCEDURE — 2580000003 HC RX 258: Performed by: INTERNAL MEDICINE

## 2025-02-17 RX ORDER — SODIUM CHLORIDE 9 MG/ML
5-250 INJECTION, SOLUTION INTRAVENOUS PRN
OUTPATIENT
Start: 2025-02-24

## 2025-02-17 RX ORDER — ACETAMINOPHEN 325 MG/1
650 TABLET ORAL
OUTPATIENT
Start: 2025-02-24

## 2025-02-17 RX ORDER — HEPARIN 100 UNIT/ML
500 SYRINGE INTRAVENOUS PRN
OUTPATIENT
Start: 2025-02-24

## 2025-02-17 RX ORDER — HYDROCORTISONE SODIUM SUCCINATE 100 MG/2ML
100 INJECTION INTRAMUSCULAR; INTRAVENOUS
OUTPATIENT
Start: 2025-02-24

## 2025-02-17 RX ORDER — SODIUM CHLORIDE 9 MG/ML
5-250 INJECTION, SOLUTION INTRAVENOUS PRN
Status: DISCONTINUED | OUTPATIENT
Start: 2025-02-17 | End: 2025-02-18 | Stop reason: HOSPADM

## 2025-02-17 RX ORDER — SODIUM CHLORIDE 9 MG/ML
5-250 INJECTION, SOLUTION INTRAVENOUS PRN
Status: CANCELLED | OUTPATIENT
Start: 2025-02-24

## 2025-02-17 RX ORDER — ONDANSETRON 2 MG/ML
8 INJECTION INTRAMUSCULAR; INTRAVENOUS
OUTPATIENT
Start: 2025-02-24

## 2025-02-17 RX ORDER — SODIUM CHLORIDE 9 MG/ML
INJECTION, SOLUTION INTRAVENOUS CONTINUOUS
OUTPATIENT
Start: 2025-02-24

## 2025-02-17 RX ORDER — ALBUTEROL SULFATE 90 UG/1
4 INHALANT RESPIRATORY (INHALATION) PRN
OUTPATIENT
Start: 2025-02-24

## 2025-02-17 RX ORDER — DIPHENHYDRAMINE HYDROCHLORIDE 50 MG/ML
50 INJECTION INTRAMUSCULAR; INTRAVENOUS
OUTPATIENT
Start: 2025-02-24

## 2025-02-17 RX ORDER — SODIUM CHLORIDE 0.9 % (FLUSH) 0.9 %
5-40 SYRINGE (ML) INJECTION PRN
Status: CANCELLED | OUTPATIENT
Start: 2025-02-24

## 2025-02-17 RX ORDER — EPINEPHRINE 1 MG/ML
0.3 INJECTION, SOLUTION, CONCENTRATE INTRAVENOUS PRN
OUTPATIENT
Start: 2025-02-24

## 2025-02-17 RX ADMIN — SODIUM CHLORIDE 25 ML/HR: 9 INJECTION, SOLUTION INTRAVENOUS at 14:04

## 2025-02-17 RX ADMIN — FERRIC CARBOXYMALTOSE INJECTION 750 MG: 50 INJECTION, SOLUTION INTRAVENOUS at 14:28

## 2025-02-18 LAB
SEND OUT REPORT: NORMAL
TEST NAME: NORMAL

## 2025-02-24 ENCOUNTER — HOSPITAL ENCOUNTER (OUTPATIENT)
Dept: INFUSION THERAPY | Age: 70
Discharge: HOME | End: 2025-02-24
Payer: MEDICARE

## 2025-02-24 VITALS
HEART RATE: 59 BPM | SYSTOLIC BLOOD PRESSURE: 160 MMHG | RESPIRATION RATE: 16 BRPM | TEMPERATURE: 97.9 F | DIASTOLIC BLOOD PRESSURE: 73 MMHG | OXYGEN SATURATION: 97 %

## 2025-02-24 DIAGNOSIS — D50.9 IRON DEFICIENCY ANEMIA, UNSPECIFIED IRON DEFICIENCY ANEMIA TYPE: Primary | ICD-10-CM

## 2025-02-24 PROCEDURE — 2500000003 HC RX 250 WO HCPCS: Performed by: INTERNAL MEDICINE

## 2025-02-24 PROCEDURE — 96374 THER/PROPH/DIAG INJ IV PUSH: CPT

## 2025-02-24 PROCEDURE — 2580000003 HC RX 258: Performed by: INTERNAL MEDICINE

## 2025-02-24 PROCEDURE — 6360000002 HC RX W HCPCS: Performed by: INTERNAL MEDICINE

## 2025-02-24 RX ORDER — SODIUM CHLORIDE 0.9 % (FLUSH) 0.9 %
5-40 SYRINGE (ML) INJECTION PRN
Status: DISCONTINUED | OUTPATIENT
Start: 2025-02-24 | End: 2025-02-25 | Stop reason: HOSPADM

## 2025-02-24 RX ORDER — HYDROCORTISONE SODIUM SUCCINATE 100 MG/2ML
100 INJECTION INTRAMUSCULAR; INTRAVENOUS
OUTPATIENT
Start: 2025-02-24

## 2025-02-24 RX ORDER — ALBUTEROL SULFATE 90 UG/1
4 INHALANT RESPIRATORY (INHALATION) PRN
OUTPATIENT
Start: 2025-02-24

## 2025-02-24 RX ORDER — ONDANSETRON 2 MG/ML
8 INJECTION INTRAMUSCULAR; INTRAVENOUS
OUTPATIENT
Start: 2025-02-24

## 2025-02-24 RX ORDER — SODIUM CHLORIDE 9 MG/ML
5-250 INJECTION, SOLUTION INTRAVENOUS PRN
Status: CANCELLED | OUTPATIENT
Start: 2025-02-24

## 2025-02-24 RX ORDER — SODIUM CHLORIDE 9 MG/ML
5-250 INJECTION, SOLUTION INTRAVENOUS PRN
OUTPATIENT
Start: 2025-02-24

## 2025-02-24 RX ORDER — HEPARIN 100 UNIT/ML
500 SYRINGE INTRAVENOUS PRN
OUTPATIENT
Start: 2025-02-24

## 2025-02-24 RX ORDER — DIPHENHYDRAMINE HYDROCHLORIDE 50 MG/ML
50 INJECTION INTRAMUSCULAR; INTRAVENOUS
OUTPATIENT
Start: 2025-02-24

## 2025-02-24 RX ORDER — ACETAMINOPHEN 325 MG/1
650 TABLET ORAL
OUTPATIENT
Start: 2025-02-24

## 2025-02-24 RX ORDER — SODIUM CHLORIDE 9 MG/ML
5-250 INJECTION, SOLUTION INTRAVENOUS PRN
Status: DISCONTINUED | OUTPATIENT
Start: 2025-02-24 | End: 2025-02-25 | Stop reason: HOSPADM

## 2025-02-24 RX ORDER — SODIUM CHLORIDE 0.9 % (FLUSH) 0.9 %
5-40 SYRINGE (ML) INJECTION PRN
OUTPATIENT
Start: 2025-02-24

## 2025-02-24 RX ORDER — EPINEPHRINE 1 MG/ML
0.3 INJECTION, SOLUTION, CONCENTRATE INTRAVENOUS PRN
OUTPATIENT
Start: 2025-02-24

## 2025-02-24 RX ORDER — SODIUM CHLORIDE 9 MG/ML
INJECTION, SOLUTION INTRAVENOUS CONTINUOUS
OUTPATIENT
Start: 2025-02-24

## 2025-02-24 RX ADMIN — SODIUM CHLORIDE, PRESERVATIVE FREE 10 ML: 5 INJECTION INTRAVENOUS at 13:47

## 2025-02-24 RX ADMIN — SODIUM CHLORIDE 50 ML/HR: 9 INJECTION, SOLUTION INTRAVENOUS at 13:47

## 2025-02-24 RX ADMIN — FERRIC CARBOXYMALTOSE INJECTION 750 MG: 50 INJECTION, SOLUTION INTRAVENOUS at 13:55

## 2025-02-24 NOTE — FLOWSHEET NOTE
Pt arrived to clinic for IV injectafer. Tolerated infusion with no complicattions. VSS- per baseline.

## 2025-04-10 DIAGNOSIS — D50.9 IRON DEFICIENCY ANEMIA, UNSPECIFIED IRON DEFICIENCY ANEMIA TYPE: Primary | ICD-10-CM

## 2025-04-16 ENCOUNTER — HOSPITAL ENCOUNTER (OUTPATIENT)
Dept: INFUSION THERAPY | Age: 70
Discharge: HOME | End: 2025-04-16
Payer: MEDICARE

## 2025-04-16 DIAGNOSIS — D50.9 IRON DEFICIENCY ANEMIA, UNSPECIFIED IRON DEFICIENCY ANEMIA TYPE: ICD-10-CM

## 2025-04-16 LAB
BASOPHILS # BLD: 0.02 K/UL (ref 0–0.2)
BASOPHILS NFR BLD: 1 % (ref 0–2)
EOSINOPHIL # BLD: 0.06 K/UL (ref 0.05–0.5)
EOSINOPHILS RELATIVE PERCENT: 3 % (ref 0–6)
ERYTHROCYTE [DISTWIDTH] IN BLOOD BY AUTOMATED COUNT: 16.5 % (ref 11.5–15)
FERRITIN SERPL-MCNC: 128 NG/ML
HCT VFR BLD AUTO: 37.8 % (ref 34–48)
HGB BLD-MCNC: 11.8 G/DL (ref 11.5–15.5)
IMM GRANULOCYTES # BLD AUTO: <0.03 K/UL (ref 0–0.58)
IMM GRANULOCYTES NFR BLD: 1 % (ref 0–5)
IRON SATN MFR SERPL: 75 % (ref 15–50)
IRON SERPL-MCNC: 206 UG/DL (ref 37–145)
LYMPHOCYTES NFR BLD: 0.47 K/UL (ref 1.5–4)
LYMPHOCYTES RELATIVE PERCENT: 22 % (ref 20–42)
MCH RBC QN AUTO: 30.5 PG (ref 26–35)
MCHC RBC AUTO-ENTMCNC: 31.2 G/DL (ref 32–34.5)
MCV RBC AUTO: 97.7 FL (ref 80–99.9)
MONOCYTES NFR BLD: 0.12 K/UL (ref 0.1–0.95)
MONOCYTES NFR BLD: 6 % (ref 2–12)
NEUTROPHILS NFR BLD: 68 % (ref 43–80)
NEUTS SEG NFR BLD: 1.48 K/UL (ref 1.8–7.3)
PLATELET # BLD AUTO: 37 K/UL (ref 130–450)
PLATELET CONFIRMATION: NORMAL
PMV BLD AUTO: 11 FL (ref 7–12)
RBC # BLD AUTO: 3.87 M/UL (ref 3.5–5.5)
TIBC SERPL-MCNC: 274 UG/DL (ref 250–450)
WBC OTHER # BLD: 2.2 K/UL (ref 4.5–11.5)

## 2025-04-16 PROCEDURE — 82728 ASSAY OF FERRITIN: CPT

## 2025-04-16 PROCEDURE — 83550 IRON BINDING TEST: CPT

## 2025-04-16 PROCEDURE — 36415 COLL VENOUS BLD VENIPUNCTURE: CPT

## 2025-04-16 PROCEDURE — 85025 COMPLETE CBC W/AUTO DIFF WBC: CPT

## 2025-04-16 PROCEDURE — 83540 ASSAY OF IRON: CPT

## 2025-04-17 ENCOUNTER — OFFICE VISIT (OUTPATIENT)
Dept: ONCOLOGY | Age: 70
End: 2025-04-17
Payer: MEDICARE

## 2025-04-17 VITALS
TEMPERATURE: 97.7 F | DIASTOLIC BLOOD PRESSURE: 87 MMHG | SYSTOLIC BLOOD PRESSURE: 163 MMHG | OXYGEN SATURATION: 97 % | HEIGHT: 66 IN | HEART RATE: 68 BPM | WEIGHT: 136 LBS | BODY MASS INDEX: 21.86 KG/M2

## 2025-04-17 DIAGNOSIS — D50.8 OTHER IRON DEFICIENCY ANEMIA: Primary | ICD-10-CM

## 2025-04-17 PROCEDURE — 1159F MED LIST DOCD IN RCRD: CPT | Performed by: INTERNAL MEDICINE

## 2025-04-17 PROCEDURE — 99213 OFFICE O/P EST LOW 20 MIN: CPT | Performed by: INTERNAL MEDICINE

## 2025-04-17 PROCEDURE — 99212 OFFICE O/P EST SF 10 MIN: CPT

## 2025-04-17 PROCEDURE — 1123F ACP DISCUSS/DSCN MKR DOCD: CPT | Performed by: INTERNAL MEDICINE

## 2025-04-17 NOTE — PROGRESS NOTES
[facility] MEDICAL ONCOLOGY  [address] [address]  Dept: [phone]  Loc: [phone]  Outpatient Follow-up Note      Identification: This is a 70 y.o. yo female     Chief Complaint:   Chief Complaint   Patient presents with    Anemia       Interval hx:   4/17/2025:  She received IV iron, and then she resumed oral iron. She felt better after IV iron. No bleeding.   She had scopes upper and lower as she said last March and they were normal.   No fevers, no chills      Review of Systems  14 points ROS negative except as mentioned above        Past Medical/Social History:   Past Medical History:   Diagnosis Date    Diabetes mellitus (HCC)     Hypertension      Past Surgical History:   Procedure Laterality Date    COLONOSCOPY N/A 10/14/2022    COLONOSCOPY DIAGNOSTIC performed by Melanie Lewis MD at [facility] ENDOSCOPY    UPPER GASTROINTESTINAL ENDOSCOPY N/A 10/14/2022    EGD BIOPSY performed by Melanie Lewis MD at [facility] ENDOSCOPY         Current Medications:   Reviewed and reconciled.  Current Outpatient Medications   Medication Sig Dispense Refill    ferrous sulfate (IRON 325) 325 (65 Fe) MG tablet Take 1 tablet by mouth every other day 90 tablet 1    ferrous sulfate (IRON 325) 325 (65 Fe) MG tablet Take 1 tablet by mouth 2 times daily (with meals) 30 tablet 3    empagliflozin (JARDIANCE) 10 MG tablet Take 1 tablet by mouth daily 30 tablet 3     No current facility-administered medications for this visit.         Allergies: Reviewed and unchanged.    PHYSICAL EXAM:   VITALS:  BP (!) 163/87   Pulse 68   Temp 97.7 °F (36.5 °C) (Temporal)   Ht 1.676 m (5' 6\")   Wt 61.7 kg (136 lb)   SpO2 97%   BMI 21.95 kg/m² . Body surface area is 1.7 meters squared.  Physical Exam  Constitutional:       General: She is not in acute distress.     Appearance: Normal appearance. She is not ill-appearing.   HENT:      Head: Normocephalic.      Nose: No

## (undated) DEVICE — SPONGE GZ W4XL4IN RAYON POLY FILL CVR W/ NONWOVEN FAB

## (undated) DEVICE — REAGENT TEST UREASE RAPD CLOTEST F/

## (undated) DEVICE — BLOCK BITE 60FR RUBBER ADLT DENTAL

## (undated) DEVICE — GRADUATE TRIANG MEASURE 1000ML BLK PRNT

## (undated) DEVICE — FORCEPS BX OVL CUP FEN DISPOSABLE CAP L 160CM RAD JAW 4

## (undated) DEVICE — SNARE POLYP M W27MMXL240CM OVL STIFF DISP CAPTIVATOR